# Patient Record
Sex: FEMALE | Race: OTHER | NOT HISPANIC OR LATINO
[De-identification: names, ages, dates, MRNs, and addresses within clinical notes are randomized per-mention and may not be internally consistent; named-entity substitution may affect disease eponyms.]

---

## 2017-01-03 ENCOUNTER — RX RENEWAL (OUTPATIENT)
Age: 77
End: 2017-01-03

## 2017-02-08 ENCOUNTER — APPOINTMENT (OUTPATIENT)
Dept: HEMATOLOGY ONCOLOGY | Facility: CLINIC | Age: 77
End: 2017-02-08

## 2017-02-15 ENCOUNTER — APPOINTMENT (OUTPATIENT)
Dept: HEMATOLOGY ONCOLOGY | Facility: CLINIC | Age: 77
End: 2017-02-15

## 2017-02-22 ENCOUNTER — APPOINTMENT (OUTPATIENT)
Dept: HEMATOLOGY ONCOLOGY | Facility: CLINIC | Age: 77
End: 2017-02-22

## 2017-03-16 ENCOUNTER — LABORATORY RESULT (OUTPATIENT)
Age: 77
End: 2017-03-16

## 2017-03-16 ENCOUNTER — APPOINTMENT (OUTPATIENT)
Dept: HEMATOLOGY ONCOLOGY | Facility: CLINIC | Age: 77
End: 2017-03-16

## 2017-03-16 VITALS — HEART RATE: 93 BPM | TEMPERATURE: 98.4 F | DIASTOLIC BLOOD PRESSURE: 72 MMHG | SYSTOLIC BLOOD PRESSURE: 129 MMHG

## 2017-03-16 VITALS — BODY MASS INDEX: 19.35 KG/M2 | HEIGHT: 59 IN | WEIGHT: 96 LBS

## 2017-03-23 ENCOUNTER — APPOINTMENT (OUTPATIENT)
Dept: HEMATOLOGY ONCOLOGY | Facility: CLINIC | Age: 77
End: 2017-03-23

## 2017-03-23 VITALS
HEIGHT: 59 IN | HEART RATE: 88 BPM | WEIGHT: 95 LBS | TEMPERATURE: 98.9 F | DIASTOLIC BLOOD PRESSURE: 69 MMHG | BODY MASS INDEX: 19.15 KG/M2 | SYSTOLIC BLOOD PRESSURE: 114 MMHG

## 2017-03-31 LAB
HCT VFR BLD CALC: 31.9 %
HGB BLD-MCNC: 9.38 G/DL
MCHC RBC-ENTMCNC: 22.2 PG
MCHC RBC-ENTMCNC: 29.4 GM/DL
MCV RBC AUTO: 75.4 FL
PLATELET # BLD AUTO: 273 K/UL
RBC # BLD: 4.23 M/UL
RBC # FLD: 17.5 %
WBC # FLD AUTO: 5.2 K/UL

## 2017-04-03 ENCOUNTER — OTHER (OUTPATIENT)
Age: 77
End: 2017-04-03

## 2017-07-20 ENCOUNTER — APPOINTMENT (OUTPATIENT)
Dept: HEMATOLOGY ONCOLOGY | Facility: CLINIC | Age: 77
End: 2017-07-20

## 2017-08-01 ENCOUNTER — APPOINTMENT (OUTPATIENT)
Dept: HEMATOLOGY ONCOLOGY | Facility: CLINIC | Age: 77
End: 2017-08-01
Payer: MEDICARE

## 2017-08-01 VITALS
WEIGHT: 94 LBS | HEIGHT: 59 IN | HEART RATE: 76 BPM | DIASTOLIC BLOOD PRESSURE: 77 MMHG | SYSTOLIC BLOOD PRESSURE: 120 MMHG | TEMPERATURE: 98.1 F | BODY MASS INDEX: 18.95 KG/M2

## 2017-08-01 PROCEDURE — 85025 COMPLETE CBC W/AUTO DIFF WBC: CPT

## 2017-08-01 PROCEDURE — 36415 COLL VENOUS BLD VENIPUNCTURE: CPT

## 2017-08-02 ENCOUNTER — LABORATORY RESULT (OUTPATIENT)
Age: 77
End: 2017-08-02

## 2017-08-07 ENCOUNTER — APPOINTMENT (OUTPATIENT)
Dept: HEMATOLOGY ONCOLOGY | Facility: CLINIC | Age: 77
End: 2017-08-07
Payer: MEDICARE

## 2017-08-07 VITALS
BODY MASS INDEX: 18.95 KG/M2 | HEIGHT: 59 IN | HEART RATE: 85 BPM | SYSTOLIC BLOOD PRESSURE: 132 MMHG | TEMPERATURE: 98.1 F | DIASTOLIC BLOOD PRESSURE: 78 MMHG | WEIGHT: 94 LBS

## 2017-08-07 DIAGNOSIS — C77.0 SECONDARY AND UNSPECIFIED MALIGNANT NEOPLASM OF LYMPH NODES OF HEAD, FACE AND NECK: ICD-10-CM

## 2017-08-07 DIAGNOSIS — Z87.39 PERSONAL HISTORY OF OTHER DISEASES OF THE MUSCULOSKELETAL SYSTEM AND CONNECTIVE TISSUE: ICD-10-CM

## 2017-08-07 PROCEDURE — 99214 OFFICE O/P EST MOD 30 MIN: CPT

## 2017-08-16 ENCOUNTER — FORM ENCOUNTER (OUTPATIENT)
Age: 77
End: 2017-08-16

## 2017-08-28 ENCOUNTER — APPOINTMENT (OUTPATIENT)
Dept: NUCLEAR MEDICINE | Facility: CLINIC | Age: 77
End: 2017-08-28

## 2017-08-31 ENCOUNTER — RECORD ABSTRACTING (OUTPATIENT)
Age: 77
End: 2017-08-31

## 2017-08-31 LAB
ALBUMIN SERPL ELPH-MCNC: 4.2 G/DL
ALP BLD-CCNC: 63 U/L
ALT SERPL-CCNC: 16 U/L
ANION GAP SERPL CALC-SCNC: 13 MMOL/L
AST SERPL-CCNC: 18 U/L
BILIRUB SERPL-MCNC: 0.3 MG/DL
BUN SERPL-MCNC: 13 MG/DL
CALCIUM SERPL-MCNC: 9.3 MG/DL
CANCER AG27-29 SERPL-ACNC: 26.4 U/ML
CHLORIDE SERPL-SCNC: 104 MMOL/L
CO2 SERPL-SCNC: 28 MMOL/L
CREAT SERPL-MCNC: 0.77 MG/DL
GLUCOSE SERPL-MCNC: 89 MG/DL
HCT VFR BLD CALC: 36 %
HGB BLD-MCNC: 11.5 G/DL
LDH SERPL-CCNC: 166 U/L
MCHC RBC-ENTMCNC: 27.6 PG
MCHC RBC-ENTMCNC: 32.1 GM/DL
MCV RBC AUTO: 86 FL
PLATELET # BLD AUTO: 222 K/UL
POTASSIUM SERPL-SCNC: 4.7 MMOL/L
PROT SERPL-MCNC: 6.4 G/DL
RBC # BLD: 4.18 M/UL
RBC # FLD: 17.8 %
SODIUM SERPL-SCNC: 145 MMOL/L
WBC # FLD AUTO: 5 K/UL

## 2017-09-01 ENCOUNTER — FORM ENCOUNTER (OUTPATIENT)
Age: 77
End: 2017-09-01

## 2017-09-02 ENCOUNTER — APPOINTMENT (OUTPATIENT)
Dept: NUCLEAR MEDICINE | Facility: CLINIC | Age: 77
End: 2017-09-02
Payer: MEDICARE

## 2017-09-02 ENCOUNTER — OUTPATIENT (OUTPATIENT)
Dept: OUTPATIENT SERVICES | Facility: HOSPITAL | Age: 77
LOS: 1 days | End: 2017-09-02
Payer: MEDICARE

## 2017-09-02 DIAGNOSIS — Z00.8 ENCOUNTER FOR OTHER GENERAL EXAMINATION: ICD-10-CM

## 2017-09-02 DIAGNOSIS — Z90.10 ACQUIRED ABSENCE OF UNSPECIFIED BREAST AND NIPPLE: Chronic | ICD-10-CM

## 2017-09-02 PROCEDURE — 78816 PET IMAGE W/CT FULL BODY: CPT | Mod: 26,PS

## 2017-09-02 PROCEDURE — 78816 PET IMAGE W/CT FULL BODY: CPT

## 2017-09-02 PROCEDURE — A9552: CPT

## 2017-10-08 ENCOUNTER — INPATIENT (INPATIENT)
Facility: HOSPITAL | Age: 77
LOS: 4 days | Discharge: HOME HEALTH | End: 2017-10-13
Attending: INTERNAL MEDICINE | Admitting: INTERNAL MEDICINE
Payer: MEDICARE

## 2017-10-08 VITALS — HEIGHT: 60 IN | WEIGHT: 115.08 LBS

## 2017-10-08 DIAGNOSIS — Z90.10 ACQUIRED ABSENCE OF UNSPECIFIED BREAST AND NIPPLE: Chronic | ICD-10-CM

## 2017-10-08 LAB
ALBUMIN SERPL ELPH-MCNC: 3.3 G/DL — SIGNIFICANT CHANGE UP (ref 3.3–5)
ALLERGY+IMMUNOLOGY DIAG STUDY NOTE: SIGNIFICANT CHANGE UP
ALP SERPL-CCNC: 90 U/L — SIGNIFICANT CHANGE UP (ref 40–120)
ALT FLD-CCNC: 19 U/L — SIGNIFICANT CHANGE UP (ref 12–78)
ANION GAP SERPL CALC-SCNC: 12 MMOL/L — SIGNIFICANT CHANGE UP (ref 5–17)
APPEARANCE UR: (no result)
APTT BLD: 30 SEC — SIGNIFICANT CHANGE UP (ref 27.5–37.4)
AST SERPL-CCNC: 25 U/L — SIGNIFICANT CHANGE UP (ref 15–37)
BACTERIA # UR AUTO: (no result)
BASOPHILS # BLD AUTO: 0 K/UL — SIGNIFICANT CHANGE UP (ref 0–0.2)
BILIRUB SERPL-MCNC: 0.7 MG/DL — SIGNIFICANT CHANGE UP (ref 0.2–1.2)
BILIRUB UR-MCNC: NEGATIVE — SIGNIFICANT CHANGE UP
BUN SERPL-MCNC: 12 MG/DL — SIGNIFICANT CHANGE UP (ref 7–23)
CALCIUM SERPL-MCNC: 9.2 MG/DL — SIGNIFICANT CHANGE UP (ref 8.5–10.1)
CHLORIDE SERPL-SCNC: 110 MMOL/L — HIGH (ref 96–108)
CO2 SERPL-SCNC: 16 MMOL/L — LOW (ref 22–31)
COLOR SPEC: YELLOW — SIGNIFICANT CHANGE UP
CREAT SERPL-MCNC: 0.98 MG/DL — SIGNIFICANT CHANGE UP (ref 0.5–1.3)
DIFF PNL FLD: (no result)
EOSINOPHIL # BLD AUTO: 0 K/UL — SIGNIFICANT CHANGE UP (ref 0–0.5)
EPI CELLS # UR: SIGNIFICANT CHANGE UP
GLUCOSE SERPL-MCNC: 117 MG/DL — HIGH (ref 70–99)
GLUCOSE UR QL: NEGATIVE MG/DL — SIGNIFICANT CHANGE UP
HCT VFR BLD CALC: 39.5 % — SIGNIFICANT CHANGE UP (ref 34.5–45)
HGB BLD-MCNC: 12.8 G/DL — SIGNIFICANT CHANGE UP (ref 11.5–15.5)
INR BLD: 1.12 RATIO — SIGNIFICANT CHANGE UP (ref 0.88–1.16)
KETONES UR-MCNC: NEGATIVE — SIGNIFICANT CHANGE UP
LACTATE SERPL-SCNC: 4.6 MMOL/L — CRITICAL HIGH (ref 0.7–2)
LACTATE SERPL-SCNC: 4.8 MMOL/L — CRITICAL HIGH (ref 0.7–2)
LEUKOCYTE ESTERASE UR-ACNC: (no result)
LYMPHOCYTES # BLD AUTO: 0.2 K/UL — LOW (ref 1–3.3)
LYMPHOCYTES # BLD AUTO: 10 % — LOW (ref 13–44)
MANUAL DIF COMMENT BLD-IMP: SIGNIFICANT CHANGE UP
MCHC RBC-ENTMCNC: 28.5 PG — SIGNIFICANT CHANGE UP (ref 27–34)
MCHC RBC-ENTMCNC: 32.3 GM/DL — SIGNIFICANT CHANGE UP (ref 32–36)
MCV RBC AUTO: 88.1 FL — SIGNIFICANT CHANGE UP (ref 80–100)
MONOCYTES # BLD AUTO: 0 K/UL — SIGNIFICANT CHANGE UP (ref 0–0.9)
MONOCYTES NFR BLD AUTO: 2 % — SIGNIFICANT CHANGE UP (ref 2–14)
NEUTROPHILS # BLD AUTO: 2.4 K/UL — SIGNIFICANT CHANGE UP (ref 1.8–7.4)
NEUTROPHILS NFR BLD AUTO: 82 % — HIGH (ref 43–77)
NEUTS BAND # BLD: 6 % — SIGNIFICANT CHANGE UP (ref 0–8)
NITRITE UR-MCNC: NEGATIVE — SIGNIFICANT CHANGE UP
NRBC # BLD: 4 /100 — HIGH (ref 0–0)
PH UR: 6 — SIGNIFICANT CHANGE UP (ref 5–8)
PLAT MORPH BLD: NORMAL — SIGNIFICANT CHANGE UP
PLATELET # BLD AUTO: 197 K/UL — SIGNIFICANT CHANGE UP (ref 150–400)
POLYCHROMASIA BLD QL SMEAR: SLIGHT — SIGNIFICANT CHANGE UP
POTASSIUM SERPL-MCNC: 3.7 MMOL/L — SIGNIFICANT CHANGE UP (ref 3.5–5.3)
POTASSIUM SERPL-SCNC: 3.7 MMOL/L — SIGNIFICANT CHANGE UP (ref 3.5–5.3)
PROT SERPL-MCNC: 7.5 GM/DL — SIGNIFICANT CHANGE UP (ref 6–8.3)
PROT UR-MCNC: 100 MG/DL
PROTHROM AB SERPL-ACNC: 12.1 SEC — SIGNIFICANT CHANGE UP (ref 9.8–12.7)
RBC # BLD: 4.48 M/UL — SIGNIFICANT CHANGE UP (ref 3.8–5.2)
RBC # FLD: 15.1 % — HIGH (ref 10.3–14.5)
RBC BLD AUTO: SIGNIFICANT CHANGE UP
RBC CASTS # UR COMP ASSIST: (no result) /HPF (ref 0–4)
SODIUM SERPL-SCNC: 138 MMOL/L — SIGNIFICANT CHANGE UP (ref 135–145)
SP GR SPEC: 1.01 — SIGNIFICANT CHANGE UP (ref 1.01–1.02)
UROBILINOGEN FLD QL: NEGATIVE MG/DL — SIGNIFICANT CHANGE UP
WBC # BLD: 2.6 K/UL — LOW (ref 3.8–10.5)
WBC # FLD AUTO: 2.6 K/UL — LOW (ref 3.8–10.5)
WBC UR QL: (no result)

## 2017-10-08 PROCEDURE — 93010 ELECTROCARDIOGRAM REPORT: CPT

## 2017-10-08 PROCEDURE — 74176 CT ABD & PELVIS W/O CONTRAST: CPT | Mod: 26

## 2017-10-08 PROCEDURE — 99291 CRITICAL CARE FIRST HOUR: CPT

## 2017-10-08 PROCEDURE — 71010: CPT | Mod: 26

## 2017-10-08 RX ORDER — SODIUM CHLORIDE 9 MG/ML
1000 INJECTION INTRAMUSCULAR; INTRAVENOUS; SUBCUTANEOUS ONCE
Qty: 0 | Refills: 0 | Status: COMPLETED | OUTPATIENT
Start: 2017-10-08 | End: 2017-10-08

## 2017-10-08 RX ORDER — ONDANSETRON 8 MG/1
4 TABLET, FILM COATED ORAL EVERY 6 HOURS
Qty: 0 | Refills: 0 | Status: DISCONTINUED | OUTPATIENT
Start: 2017-10-08 | End: 2017-10-13

## 2017-10-08 RX ORDER — VANCOMYCIN HCL 1 G
1000 VIAL (EA) INTRAVENOUS ONCE
Qty: 0 | Refills: 0 | Status: COMPLETED | OUTPATIENT
Start: 2017-10-08 | End: 2017-10-08

## 2017-10-08 RX ORDER — ACETAMINOPHEN 500 MG
650 TABLET ORAL ONCE
Qty: 0 | Refills: 0 | Status: COMPLETED | OUTPATIENT
Start: 2017-10-08 | End: 2017-10-08

## 2017-10-08 RX ORDER — PANTOPRAZOLE SODIUM 20 MG/1
40 TABLET, DELAYED RELEASE ORAL ONCE
Qty: 0 | Refills: 0 | Status: COMPLETED | OUTPATIENT
Start: 2017-10-08 | End: 2017-10-08

## 2017-10-08 RX ORDER — CEFTRIAXONE 500 MG/1
1 INJECTION, POWDER, FOR SOLUTION INTRAMUSCULAR; INTRAVENOUS ONCE
Qty: 0 | Refills: 0 | Status: COMPLETED | OUTPATIENT
Start: 2017-10-08 | End: 2017-10-08

## 2017-10-08 RX ORDER — SODIUM CHLORIDE 9 MG/ML
1000 INJECTION INTRAMUSCULAR; INTRAVENOUS; SUBCUTANEOUS
Qty: 0 | Refills: 0 | Status: COMPLETED | OUTPATIENT
Start: 2017-10-08 | End: 2017-10-09

## 2017-10-08 RX ORDER — SENNA PLUS 8.6 MG/1
2 TABLET ORAL AT BEDTIME
Qty: 0 | Refills: 0 | Status: DISCONTINUED | OUTPATIENT
Start: 2017-10-08 | End: 2017-10-13

## 2017-10-08 RX ORDER — ONDANSETRON 8 MG/1
4 TABLET, FILM COATED ORAL ONCE
Qty: 0 | Refills: 0 | Status: COMPLETED | OUTPATIENT
Start: 2017-10-08 | End: 2017-10-08

## 2017-10-08 RX ORDER — INFLUENZA VIRUS VACCINE 15; 15; 15; 15 UG/.5ML; UG/.5ML; UG/.5ML; UG/.5ML
0.5 SUSPENSION INTRAMUSCULAR ONCE
Qty: 0 | Refills: 0 | Status: COMPLETED | OUTPATIENT
Start: 2017-10-08 | End: 2017-10-08

## 2017-10-08 RX ORDER — EXEMESTANE 25 MG/1
25 TABLET, SUGAR COATED ORAL
Qty: 0 | Refills: 0 | Status: COMPLETED | OUTPATIENT
Start: 2017-10-08 | End: 2017-10-09

## 2017-10-08 RX ORDER — SODIUM CHLORIDE 9 MG/ML
500 INJECTION INTRAMUSCULAR; INTRAVENOUS; SUBCUTANEOUS ONCE
Qty: 0 | Refills: 0 | Status: COMPLETED | OUTPATIENT
Start: 2017-10-08 | End: 2017-10-08

## 2017-10-08 RX ORDER — PANTOPRAZOLE SODIUM 20 MG/1
8 TABLET, DELAYED RELEASE ORAL
Qty: 80 | Refills: 0 | Status: DISCONTINUED | OUTPATIENT
Start: 2017-10-08 | End: 2017-10-10

## 2017-10-08 RX ORDER — CEFTRIAXONE 500 MG/1
INJECTION, POWDER, FOR SOLUTION INTRAMUSCULAR; INTRAVENOUS
Qty: 0 | Refills: 0 | Status: DISCONTINUED | OUTPATIENT
Start: 2017-10-08 | End: 2017-10-13

## 2017-10-08 RX ORDER — CEFTRIAXONE 500 MG/1
1 INJECTION, POWDER, FOR SOLUTION INTRAMUSCULAR; INTRAVENOUS EVERY 24 HOURS
Qty: 0 | Refills: 0 | Status: DISCONTINUED | OUTPATIENT
Start: 2017-10-09 | End: 2017-10-13

## 2017-10-08 RX ORDER — HYDROMORPHONE HYDROCHLORIDE 2 MG/ML
0.5 INJECTION INTRAMUSCULAR; INTRAVENOUS; SUBCUTANEOUS ONCE
Qty: 0 | Refills: 0 | Status: DISCONTINUED | OUTPATIENT
Start: 2017-10-08 | End: 2017-10-08

## 2017-10-08 RX ADMIN — PANTOPRAZOLE SODIUM 40 MILLIGRAM(S): 20 TABLET, DELAYED RELEASE ORAL at 15:54

## 2017-10-08 RX ADMIN — SODIUM CHLORIDE 1000 MILLILITER(S): 9 INJECTION INTRAMUSCULAR; INTRAVENOUS; SUBCUTANEOUS at 16:11

## 2017-10-08 RX ADMIN — PANTOPRAZOLE SODIUM 10 MG/HR: 20 TABLET, DELAYED RELEASE ORAL at 21:38

## 2017-10-08 RX ADMIN — SODIUM CHLORIDE 1000 MILLILITER(S): 9 INJECTION INTRAMUSCULAR; INTRAVENOUS; SUBCUTANEOUS at 14:30

## 2017-10-08 RX ADMIN — ONDANSETRON 4 MILLIGRAM(S): 8 TABLET, FILM COATED ORAL at 14:30

## 2017-10-08 RX ADMIN — Medication 250 MILLIGRAM(S): at 17:19

## 2017-10-08 RX ADMIN — SODIUM CHLORIDE 1000 MILLILITER(S): 9 INJECTION INTRAMUSCULAR; INTRAVENOUS; SUBCUTANEOUS at 23:14

## 2017-10-08 RX ADMIN — Medication 650 MILLIGRAM(S): at 16:35

## 2017-10-08 RX ADMIN — PANTOPRAZOLE SODIUM 40 MILLIGRAM(S): 20 TABLET, DELAYED RELEASE ORAL at 14:30

## 2017-10-08 RX ADMIN — CEFTRIAXONE 100 GRAM(S): 500 INJECTION, POWDER, FOR SOLUTION INTRAMUSCULAR; INTRAVENOUS at 16:27

## 2017-10-08 NOTE — ED PROVIDER NOTE - OBJECTIVE STATEMENT
78 y/o F with a PMHx of brain aneurysm last year, Breast CA presents to the ED c/o worsening L sided flank pain over the past few days. Pt family at bedside states that her pain is worsening, experiencing multiple episodes of dark emesis today. No fever, SOB, CP, melena. Sx began today. Recent PET scan was normal. NKA. Oncology is [facility].

## 2017-10-08 NOTE — H&P ADULT - ASSESSMENT
77 y.o. 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 Pt reports coffee ground emesis, nausea/vomiting. Pt states it started this AM and had worsening left flank pain, 10/10 in severity. Denies subjective fever, but has chills. +n/v. Denies CP, SOB, abd pain, dysuria or diarrhea. Denies LH, dizziness, or HA. Currently, pt is painfree.      #severe sepsis/septic shock (leukopenia 2.6, hypotension, lactate 4.8, fever 105.0) due to UTI and left renal colic  #moderate left hydronephrosis, mild left hydroureter  -admit to step down  -iv hydration  -vital signs monitoring  -iv ceftriaxone  -f/u cultures  -repeat lactate  -antipyretics  -pt was seen by ICU, not ICU at this time  -ID consult    #coffee ground emesis, UGIB  -monitor H/H  -iv ppi drip  -GI consult  -NPO except meds for now    #breast cancer s/p R mastectomy  -pt on exemestane 25mg daily    #DVT ppx  -SCDs due to possible UGIB    #pt's daughter Cristiane updated of pt's condition in detail as she's in sepsis. Collateral info received. Cristiane's contact: [phone] (c), [phone] (h).

## 2017-10-08 NOTE — CONSULT NOTE ADULT - SUBJECTIVE AND OBJECTIVE BOX
Called to see the patient in the ED.      The patient is a 77 year old female who developed L Flank pain, fevers, and chills at 6AM.  The pain continued throughout the morning and she was vomiting coffee grinds as well. The pain eventually resloved.  She had a CT in the ED that chowed L hydro but no obstructing stone in the ureter.  She also had a temp to 105 and a Lactate of 4.3 with hypotension.  She is S/P 1 L NS and her BP has improved and the fever is improved.  S/P Rocephin.  She is awaiting a repeat lactate.      PMH: Breast ca, HLD, SAH S/O clip    PSH: Clipping of a cerebral aneurysm, mastectomy, gastric surgery    ALL: NKDA    FH: Noncontributery    SH: Non smoker, no ETOH

## 2017-10-08 NOTE — ED PROVIDER NOTE - CRITICAL CARE PROVIDED
additional history taking/consultation with other physicians/consult w/ pt's family directly relating to pts condition/direct patient care (not related to procedure)/documentation/interpretation of diagnostic studies

## 2017-10-08 NOTE — H&P ADULT - HISTORY OF PRESENT ILLNESS
77 y.o. 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 Pt reports coffee ground emesis, nausea/vomiting. Pt states it started this AM and had worsening left flank pain, 10/10 in severity. Denies subjective fever, but has chills. +n/v. Denies CP, SOB, abd pain, dysuria or diarrhea. Denies LH, dizziness, or HA. Currently, pt is painfree.    PMH: as above  PSH: as above and pyloric stenosis surgery (age 19), right mastectomy with lymph node dissection (2012)  Social Hx: denies tobacco, drugs, social EtOH  Family Hx: noncontrib to current presentation  ROS: per HPI 77 y.o. 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 Pt reports coffee ground emesis, nausea/vomiting. Pt states it started this AM and had worsening left flank pain, 10/10 in severity. Denies subjective fever, but has chills. +n/v. Denies CP, SOB, abd pain, dysuria or diarrhea. Denies LH, dizziness, or HA. Currently, pt is painfree.    Per daughter, pt vomited 3 times with blood. The vomitus appears "black". Per daughter, pt had urinary symptoms for the past week.    In ED, pt received 2L NS, vanco, ceftriaxone    PMH: as above  PSH: as above and pyloric stenosis surgery (age 19), right mastectomy with lymph node dissection (2012)  Social Hx: denies tobacco, drugs, social EtOH  Family Hx: noncontrib to current presentation  ROS: per HPI

## 2017-10-08 NOTE — CONSULT NOTE ADULT - ASSESSMENT
A/P77 female who presented with septic shock likely from a  source and possible UGIB    Plan:  Patients BP has improved after IVF  S/P rocephin  She likely has passed the stone  She will need a repeat Lactate, Needs to finish the 30 cc/kg fluid challenge  Was called into --no intervention at this time  Will need PPiI Drip and GI consult--it is likely from wrenching and vomiting from the kidney stome and not likely a primary UGIB  D/W Dr. Rossi and Dr. Montalvo in the ED  The patient does not require at this time.

## 2017-10-08 NOTE — ED ADULT NURSE REASSESSMENT NOTE - NS ED NURSE REASSESS COMMENT FT1
Pt has sudden onset of chills, MD Clark made aware of PT rectal temp 105, orders placed for blood cultures & lactate, phlebotomy at bedside.

## 2017-10-08 NOTE — SEPSIS NOTE - ASSESSMENT
77 y.o. 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    Pt had Tm 105, BP 79/58 in ED. Currently, vitals improved.    A/P:  See H&P

## 2017-10-08 NOTE — H&P ADULT - NSHPPHYSICALEXAM_GEN_ALL_CORE
Vital Signs Last 24 Hrs  T(C): 37.8 (08 Oct 2017 18:30), Max: 40.6 (08 Oct 2017 16:13)  T(F): 100 (08 Oct 2017 18:30), Max: 105 (08 Oct 2017 16:13)  HR: 83 (08 Oct 2017 19:00) (65 - 126)  BP: 102/59 (08 Oct 2017 19:00) (79/58 - 125/87)  BP(mean): --  RR: 19 (08 Oct 2017 19:00) (18 - 22)  SpO2: 98% (08 Oct 2017 19:00) (97% - 100%)    GEN: appears comfortable, fatigued  Neuro: AAOx3, nonfocal  HEENT: NC/AT, EOMI  Neck: no thyroidmegaly, no JVD  Cardiovascular: S1S2 present, regular rhythm, no murmur  Respiratory: breath sounds normal bilaterally, no wheezing, no rales, no rhonchi  Gastrointestinal: bowel sounds normal, soft, no abdominal tenderness  Musculoskeletal: no muscle tenderness  Extremities: No edema  Skin: No rash Vital Signs Last 24 Hrs  T(C): 37.8 (08 Oct 2017 18:30), Max: 40.6 (08 Oct 2017 16:13)  T(F): 100 (08 Oct 2017 18:30), Max: 105 (08 Oct 2017 16:13)  HR: 83 (08 Oct 2017 19:00) (65 - 126)  BP: 102/59 (08 Oct 2017 19:00) (79/58 - 125/87)  BP(mean): --  RR: 19 (08 Oct 2017 19:00) (18 - 22)  SpO2: 98% (08 Oct 2017 19:00) (97% - 100%)    GEN: appears comfortable, fatigued. Dried blood on mouth.  Neuro: AAOx3, nonfocal  HEENT: NC/AT, EOMI  Neck: no thyroidmegaly, no JVD  Cardiovascular: S1S2 present, regular rhythm, no murmur  Respiratory: breath sounds normal bilaterally, no wheezing, no rales, no rhonchi  Gastrointestinal: bowel sounds normal, soft, no abdominal tenderness  Musculoskeletal: no muscle tenderness  Extremities: No edema  Skin: No rash

## 2017-10-08 NOTE — ED STATDOCS - PROGRESS NOTE DETAILS
Kimberly Carmichael Stack: 78 y/o F with a PMHx of brain aneurysm last year, Breast CA presents to the ED c/o worsening L sided flank pain over the past few days. Pt family at bedside states that her pain is worsening, experiencing multiple episodes of dark emesis today. Pt being sent to the [facility] for further eval and tx.

## 2017-10-08 NOTE — SEPSIS NOTE - NSSUBJFT_GEN_A_CORE
77 y.o. 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

## 2017-10-08 NOTE — ED PROVIDER NOTE - PROGRESS NOTE DETAILS
Kimberly VARGAS: Dr. Clark has made pt and family aware of CT results and present kidney stone. Scribe DG: Pt VS improved. Spoke with Dr. Barrett. Although pt has hydronephosis, no obstructing stone is seen. He advised iv antibiotics and fluids, and treatment of her gi bleed. Amber VARGAS

## 2017-10-08 NOTE — ED ADULT NURSE NOTE - OBJECTIVE STATEMENT
Pt is a 77y female, VSS, A & O x 3, presents to ED w/ left flank pain & nausea, vomiting x 1 day, pt denies chest pain, SOB, fever, chills, pt vomiting x 1 upon arrival, vitaly burgess MD Cusati made aware, IV lock placed in left AC, unable to obtain lab work, phlebotomy called, unable to obtain lab work, MD Clark aware, pt in no apparent distress, will continue to monitor.

## 2017-10-09 DIAGNOSIS — K92.2 GASTROINTESTINAL HEMORRHAGE, UNSPECIFIED: ICD-10-CM

## 2017-10-09 DIAGNOSIS — N13.30 UNSPECIFIED HYDRONEPHROSIS: ICD-10-CM

## 2017-10-09 LAB
ANION GAP SERPL CALC-SCNC: 11 MMOL/L — SIGNIFICANT CHANGE UP (ref 5–17)
BUN SERPL-MCNC: 18 MG/DL — SIGNIFICANT CHANGE UP (ref 7–23)
CALCIUM SERPL-MCNC: 7.2 MG/DL — LOW (ref 8.5–10.1)
CHLORIDE SERPL-SCNC: 122 MMOL/L — HIGH (ref 96–108)
CO2 SERPL-SCNC: 17 MMOL/L — LOW (ref 22–31)
CREAT SERPL-MCNC: 0.81 MG/DL — SIGNIFICANT CHANGE UP (ref 0.5–1.3)
E COLI DNA BLD POS QL NAA+NON-PROBE: SIGNIFICANT CHANGE UP
GLUCOSE SERPL-MCNC: 55 MG/DL — LOW (ref 70–99)
GRAM STN FLD: SIGNIFICANT CHANGE UP
GRAM STN FLD: SIGNIFICANT CHANGE UP
HCT VFR BLD CALC: 32.3 % — LOW (ref 34.5–45)
HGB BLD-MCNC: 10.2 G/DL — LOW (ref 11.5–15.5)
LACTATE SERPL-SCNC: 2.7 MMOL/L — HIGH (ref 0.7–2)
MCHC RBC-ENTMCNC: 28.4 PG — SIGNIFICANT CHANGE UP (ref 27–34)
MCHC RBC-ENTMCNC: 31.7 GM/DL — LOW (ref 32–36)
MCV RBC AUTO: 89.4 FL — SIGNIFICANT CHANGE UP (ref 80–100)
METHOD TYPE: SIGNIFICANT CHANGE UP
PLATELET # BLD AUTO: 169 K/UL — SIGNIFICANT CHANGE UP (ref 150–400)
POTASSIUM SERPL-MCNC: 3.1 MMOL/L — LOW (ref 3.5–5.3)
POTASSIUM SERPL-SCNC: 3.1 MMOL/L — LOW (ref 3.5–5.3)
RBC # BLD: 3.61 M/UL — LOW (ref 3.8–5.2)
RBC # FLD: 15.9 % — HIGH (ref 10.3–14.5)
SODIUM SERPL-SCNC: 150 MMOL/L — HIGH (ref 135–145)
SPECIMEN SOURCE: SIGNIFICANT CHANGE UP
SPECIMEN SOURCE: SIGNIFICANT CHANGE UP
WBC # BLD: 34.3 K/UL — HIGH (ref 3.8–10.5)
WBC # FLD AUTO: 34.3 K/UL — HIGH (ref 3.8–10.5)

## 2017-10-09 PROCEDURE — 50432 PLMT NEPHROSTOMY CATHETER: CPT | Mod: LT

## 2017-10-09 PROCEDURE — 74176 CT ABD & PELVIS W/O CONTRAST: CPT | Mod: 26

## 2017-10-09 RX ORDER — SODIUM CHLORIDE 9 MG/ML
1000 INJECTION INTRAMUSCULAR; INTRAVENOUS; SUBCUTANEOUS ONCE
Qty: 0 | Refills: 0 | Status: COMPLETED | OUTPATIENT
Start: 2017-10-09 | End: 2017-10-09

## 2017-10-09 RX ORDER — ACETAMINOPHEN 500 MG
1000 TABLET ORAL ONCE
Qty: 0 | Refills: 0 | Status: COMPLETED | OUTPATIENT
Start: 2017-10-09 | End: 2017-10-09

## 2017-10-09 RX ORDER — PHENYLEPHRINE HYDROCHLORIDE 10 MG/ML
0.5 INJECTION INTRAVENOUS
Qty: 80 | Refills: 0 | Status: DISCONTINUED | OUTPATIENT
Start: 2017-10-09 | End: 2017-10-11

## 2017-10-09 RX ORDER — OXYCODONE HYDROCHLORIDE 5 MG/1
5 TABLET ORAL EVERY 6 HOURS
Qty: 0 | Refills: 0 | Status: DISCONTINUED | OUTPATIENT
Start: 2017-10-09 | End: 2017-10-13

## 2017-10-09 RX ADMIN — Medication 1000 MILLIGRAM(S): at 16:30

## 2017-10-09 RX ADMIN — Medication 400 MILLIGRAM(S): at 16:20

## 2017-10-09 RX ADMIN — SODIUM CHLORIDE 100 MILLILITER(S): 9 INJECTION INTRAMUSCULAR; INTRAVENOUS; SUBCUTANEOUS at 08:19

## 2017-10-09 RX ADMIN — PANTOPRAZOLE SODIUM 10 MG/HR: 20 TABLET, DELAYED RELEASE ORAL at 19:14

## 2017-10-09 RX ADMIN — PHENYLEPHRINE HYDROCHLORIDE 9.79 MICROGRAM(S)/KG/MIN: 10 INJECTION INTRAVENOUS at 09:33

## 2017-10-09 RX ADMIN — PANTOPRAZOLE SODIUM 10 MG/HR: 20 TABLET, DELAYED RELEASE ORAL at 09:34

## 2017-10-09 RX ADMIN — SODIUM CHLORIDE 1000 MILLILITER(S): 9 INJECTION INTRAMUSCULAR; INTRAVENOUS; SUBCUTANEOUS at 01:19

## 2017-10-09 RX ADMIN — SODIUM CHLORIDE 2000 MILLILITER(S): 9 INJECTION INTRAMUSCULAR; INTRAVENOUS; SUBCUTANEOUS at 08:17

## 2017-10-09 RX ADMIN — SODIUM CHLORIDE 100 MILLILITER(S): 9 INJECTION INTRAMUSCULAR; INTRAVENOUS; SUBCUTANEOUS at 18:25

## 2017-10-09 NOTE — PROGRESS NOTE ADULT - SUBJECTIVE AND OBJECTIVE BOX
CC:    HPI:  77 y.o. 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 Pt reports coffee ground emesis, nausea/vomiting. Pt states it started this AM and had worsening left flank pain, 10/10 in severity. Denies subjective fever, but has chills. +n/v. Denies CP, SOB, abd pain, dysuria or diarrhea. Denies LH, dizziness, or HA. Currently, pt is painfree.    Per daughter, pt vomited 3 times with blood. The vomitus appears "black". Per daughter, pt had urinary symptoms for the past week.    In ED, pt received 2L NS, vanco, ceftriaxone    10/9: Lactate has improved however still with hypotension.  Currently getting a 1L Bolus.  No melena.  No back or flank pain    PMH: as above  PSH: as above and pyloric stenosis surgery (age 19), right mastectomy with lymph node dissection ()  Social Hx: denies tobacco, drugs, social EtOH  Family Hx: noncontrib to current presentation  ROS: per HPI (08 Oct 2017 20:15)       PAST MEDICAL & SURGICAL HISTORY:  Breast CA  H/O mastectomy      FAMILY HISTORY:      Social Hx:    Allergies    No Known Allergies    Intolerances        Height (cm): 152.4 (10-08 @ 13:01)  Weight (kg): 52.2 (10-08 @ 13:01)  BMI (kg/m2): 22.5 (10-08 @ 13:01)    ICU Vital Signs Last 24 Hrs  T(C): 36.7 (09 Oct 2017 05:49), Max: 40.6 (08 Oct 2017 16:13)  T(F): 98 (09 Oct 2017 05:49), Max: 105 (08 Oct 2017 16:13)  HR: 73 (09 Oct 2017 05:00) (65 - 126)  BP: 87/36 (09 Oct 2017 05:00) (68/38 - 125/87)  BP(mean): 49 (09 Oct 2017 05:00) (44 - 73)  ABP: --  ABP(mean): --  RR: 16 (09 Oct 2017 05:00) (16 - 23)  SpO2: 99% (09 Oct 2017 04:00) (95% - 100%)          I&O's Summary    08 Oct 2017 07:01  -  09 Oct 2017 06:55  --------------------------------------------------------  IN: 2220 mL / OUT: 200 mL / NET: 2020 mL                              12.8   2.6   )-----------( 197      ( 08 Oct 2017 15:26 )             39.5       10    138  |  110<H>  |  12  ----------------------------<  117<H>  3.7   |  16<L>  |  0.98    Ca    9.2      08 Oct 2017 14:41    TPro  7.5  /  Alb  3.3  /  TBili  0.7  /  DBili  x   /  AST  25  /  ALT  19  /  AlkPhos  90  10                Urinalysis Basic - ( 08 Oct 2017 17:05 )    Color: Yellow / Appearance: Slightly Turbid / S.010 / pH: x  Gluc: x / Ketone: Negative  / Bili: Negative / Urobili: Negative mg/dL   Blood: x / Protein: 100 mg/dL / Nitrite: Negative   Leuk Esterase: Moderate / RBC: 6-10 /HPF / WBC 11-25   Sq Epi: x / Non Sq Epi: Occasional / Bacteria: Many        MEDICATIONS  (STANDING):  cefTRIAXone   IVPB      cefTRIAXone   IVPB 1 Gram(s) IV Intermittent every 24 hours  exemestane 25 milliGRAM(s) Oral <User Schedule>  influenza   Vaccine 0.5 milliLiter(s) IntraMuscular once  pantoprazole Infusion 8 mG/Hr (10 mL/Hr) IV Continuous <Continuous>  sodium chloride 0.9%. 1000 milliLiter(s) (100 mL/Hr) IV Continuous <Continuous>    MEDICATIONS  (PRN):  ondansetron Injectable 4 milliGRAM(s) IV Push every 6 hours PRN Nausea  senna 2 Tablet(s) Oral at bedtime PRN Constipation      DVT Prophylaxis:    Advanced Directives:  Discussed with:    Visit Information:    ** Time is exclusive of billed procedures and/or teaching and/or routine family updates.

## 2017-10-09 NOTE — PROGRESS NOTE ADULT - ASSESSMENT
A/P77 female who presented with septic shock likely from a  source and possible UGIB    Plan:  Currently receiving another L Bolus of NS  MAy need pressors  Continue rocephin  She likely has passed the stone  IF she remains hypotensive she may need to see if the L Hydro continues and still may need a PCN  Was called into --no intervention at this time  Continue PPiI Drip and GI consult--it is likely from wrenching and vomiting from the kidney stone and not likely a primary UGIB

## 2017-10-09 NOTE — BRIEF OPERATIVE NOTE - PROCEDURE
<<-----Click on this checkbox to enter Procedure Nephrostomy of left kidney with imaging guidance  10/09/2017    Active  DAXELROD

## 2017-10-09 NOTE — CHART NOTE - NSCHARTNOTEFT_GEN_A_CORE
spoke to Dr Dodge- IR     patient to be changed to NPO   repeat CT scan     may possibly need perc nephrostomy tube

## 2017-10-09 NOTE — CONSULT NOTE ADULT - SUBJECTIVE AND OBJECTIVE BOX
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 Pt reports coffee ground emesis, nausea/vomiting. She had worsening left flank pain, 10/10 in severity. Denies subjective fever, but has chills. +n/v. Per daughter, pt vomited 3 times with blood. The vomitus appears "black". Per daughter, pt had urinary symptoms for the past week. In ER< she was noted febrile to 105F. She received vancomycin IV and ceftriaxone.       PMH: as above  PSH: as above; pyloric stenosis surgery (age 19), right mastectomy with lymph node dissection ()  Meds: per reconciliation sheet, noted below  MEDICATIONS  (STANDING):  cefTRIAXone   IVPB      cefTRIAXone   IVPB 1 Gram(s) IV Intermittent every 24 hours  influenza   Vaccine 0.5 milliLiter(s) IntraMuscular once  pantoprazole Infusion 8 mG/Hr (10 mL/Hr) IV Continuous <Continuous>  phenylephrine    Infusion 0.5 MICROgram(s)/kG/Min (9.787 mL/Hr) IV Continuous <Continuous>  sodium chloride 0.9%. 1000 milliLiter(s) (100 mL/Hr) IV Continuous <Continuous>    MEDICATIONS  (PRN):  ondansetron Injectable 4 milliGRAM(s) IV Push every 6 hours PRN Nausea  senna 2 Tablet(s) Oral at bedtime PRN Constipation    Allergies    No Known Allergies    Intolerances      Social: no smoking, no alcohol, no illegal drugs; no recent travel, no exposure to TB  FAMILY HISTORY:    ROS: the patient denies HA, no dizziness, no sore throat, no blurry vision, no CP, no palpitations, no SOB, no cough, no abdominal pain, no diarrhea, no N/V, no dysuria, no leg pain, has left flank pain, no rash, no joint aches, no rectal pain or bleeding, no night sweats    Vital Signs Last 24 Hrs  T(C): 37 (09 Oct 2017 09:30), Max: 40.6 (08 Oct 2017 16:13)  T(F): 98.6 (09 Oct 2017 09:30), Max: 105 (08 Oct 2017 16:13)  HR: 76 (09 Oct 2017 09:57) (65 - 126)  BP: 99/51 (09 Oct 2017 09:57) (68/38 - 125/87)  BP(mean): 62 (09 Oct 2017 09:57) (43 - 73)  RR: 19 (09 Oct 2017 09:57) (16 - 23)  SpO2: 100% (09 Oct 2017 09:57) (95% - 100%)  Daily Height in cm: 152.4 (08 Oct 2017 13:01)    Daily     PE:    Constitutional: frail looking  HEENT: NC/AT, EOMI, PERRLA  Neck: supple  Back: no tenderness  Respiratory: clear  Cardiovascular: S1S2 regular, no murmurs  Abdomen: soft, not tender, not distended, positive BS  Genitourinary: no LN palpable  Rectal: deferred  Musculoskeletal: no muscle tenderness, no joint swelling; has left flank tenderness  Extremities: no pedal edema  Neurological: AxOx3, moving all extremities  Skin: no rashes    Labs:                        12.8   2.6   )-----------( 197      ( 08 Oct 2017 15:26 )             39.5     10-08    138  |  110<H>  |  12  ----------------------------<  117<H>  3.7   |  16<L>  |  0.98    Ca    9.2      08 Oct 2017 14:41    TPro  7.5  /  Alb  3.3  /  TBili  0.7  /  DBili  x   /  AST  25  /  ALT  19  /  AlkPhos  90  10-08     LIVER FUNCTIONS - ( 08 Oct 2017 14:41 )  Alb: 3.3 g/dL / Pro: 7.5 gm/dL / ALK PHOS: 90 U/L / ALT: 19 U/L / AST: 25 U/L / GGT: x           Urinalysis Basic - ( 08 Oct 2017 17:05 )    Color: Yellow / Appearance: Slightly Turbid / S.010 / pH: x  Gluc: x / Ketone: Negative  / Bili: Negative / Urobili: Negative mg/dL   Blood: x / Protein: 100 mg/dL / Nitrite: Negative   Leuk Esterase: Moderate / RBC: 6-10 /HPF / WBC 11-25   Sq Epi: x / Non Sq Epi: Occasional / Bacteria: Many        Culture Results:   Growth in aerobic bottle:  Gram Negative Rods  ***Blood Panel PCR results on this specimen are available  approximately 3 hours after the Gram stain result.***  Gram stain, PCR, and/or culture results may not always  correspond due to difference inmethodologies.  ************************************************************  This PCR assay was performed using [employer].  The following targets are tested for: Enterococcus,  vancomycin resistant enterococci, Listeria monocytogenes,  coagulase negative staphylococci, S. aureus,  methicillin resistant S. aureus, Streptococcus agalactiae  (Group B), S. pneumoniae, S. pyogenes (Group A),  Acinetobacter baumannii, Enterobacter cloacae, E. coli,  Klebsiella oxytoca, K. pneumoniae, Proteus sp.,  Serratia marcescens, Haemophilus influenzae,  Neisseria meningitidis, Pseudomonas aeruginosa, Candida  albicans, C. glabrata, C krusei, C parapsilosis,  C. tropicalis and the KPC resistance gene. (10-08 @ 16:25)    Radiology:    < from: CT Renal Stone Hunt (10.08.17 @ 14:01) >  moderate LEFT hydronephrosis and mild LEFT hydroureter. An   obstructing calculus is not identified.  No perinephric infiltration is   seen. There is a 3 mm calculus at the lower pole of the LEFT kidney.            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 Pt reports coffee ground emesis, nausea/vomiting. She had worsening left flank pain, 10/10 in severity. Denies subjective fever, but has chills. +n/v. Per daughter, pt vomited 3 times with blood. The vomitus appears "black". Per daughter, pt had urinary symptoms for the past week. In ER< she was noted febrile to 105F. She received vancomycin IV and ceftriaxone.       PMH: as above  PSH: as above; pyloric stenosis surgery (age 19), right mastectomy with lymph node dissection ()  Meds: per reconciliation sheet, noted below  MEDICATIONS  (STANDING):  cefTRIAXone   IVPB      cefTRIAXone   IVPB 1 Gram(s) IV Intermittent every 24 hours  influenza   Vaccine 0.5 milliLiter(s) IntraMuscular once  pantoprazole Infusion 8 mG/Hr (10 mL/Hr) IV Continuous <Continuous>  phenylephrine    Infusion 0.5 MICROgram(s)/kG/Min (9.787 mL/Hr) IV Continuous <Continuous>  sodium chloride 0.9%. 1000 milliLiter(s) (100 mL/Hr) IV Continuous <Continuous>    MEDICATIONS  (PRN):  ondansetron Injectable 4 milliGRAM(s) IV Push every 6 hours PRN Nausea  senna 2 Tablet(s) Oral at bedtime PRN Constipation    Allergies    No Known Allergies    Intolerances      Social: no smoking, no alcohol, no illegal drugs; no recent travel, no exposure to TB  FAMILY HISTORY:    ROS: the patient denies HA, no dizziness, no sore throat, no blurry vision, no CP, no palpitations, no SOB, no cough, no abdominal pain, no diarrhea, no N/V, no dysuria, no leg pain, has left flank pain, no rash, no joint aches, no rectal pain or bleeding, no night sweats    Vital Signs Last 24 Hrs  T(C): 37 (09 Oct 2017 09:30), Max: 40.6 (08 Oct 2017 16:13)  T(F): 98.6 (09 Oct 2017 09:30), Max: 105 (08 Oct 2017 16:13)  HR: 76 (09 Oct 2017 09:57) (65 - 126)  BP: 99/51 (09 Oct 2017 09:57) (68/38 - 125/87)  BP(mean): 62 (09 Oct 2017 09:57) (43 - 73)  RR: 19 (09 Oct 2017 09:57) (16 - 23)  SpO2: 100% (09 Oct 2017 09:57) (95% - 100%)  Daily Height in cm: 152.4 (08 Oct 2017 13:01)    Daily     PE:    Constitutional: frail looking  HEENT: NC/AT, EOMI, PERRLA  Neck: supple  Back: no tenderness  Respiratory: clear  Cardiovascular: S1S2 regular, no murmurs  Abdomen: soft, not tender, not distended, positive BS  Genitourinary: no LN palpable  Rectal: deferred  Musculoskeletal: no muscle tenderness, no joint swelling; has left flank tenderness  Extremities: no pedal edema  Neurological: AxOx3, moving all extremities  Skin: no rashes    Labs:                        12.8   2.6   )-----------( 197      ( 08 Oct 2017 15:26 )             39.5     10-08    138  |  110<H>  |  12  ----------------------------<  117<H>  3.7   |  16<L>  |  0.98    Ca    9.2      08 Oct 2017 14:41    TPro  7.5  /  Alb  3.3  /  TBili  0.7  /  DBili  x   /  AST  25  /  ALT  19  /  AlkPhos  90  10-08     LIVER FUNCTIONS - ( 08 Oct 2017 14:41 )  Alb: 3.3 g/dL / Pro: 7.5 gm/dL / ALK PHOS: 90 U/L / ALT: 19 U/L / AST: 25 U/L / GGT: x           Urinalysis Basic - ( 08 Oct 2017 17:05 )    Color: Yellow / Appearance: Slightly Turbid / S.010 / pH: x  Gluc: x / Ketone: Negative  / Bili: Negative / Urobili: Negative mg/dL   Blood: x / Protein: 100 mg/dL / Nitrite: Negative   Leuk Esterase: Moderate / RBC: 6-10 /HPF / WBC 11-25   Sq Epi: x / Non Sq Epi: Occasional / Bacteria: Many        Culture Results:   Growth in aerobic bottle:  Gram Negative Rods  ***Blood Panel PCR results on this specimen are available  approximately 3 hours after the Gram stain result.***  Gram stain, PCR, and/or culture results may not always  correspond due to difference inmethodologies.  ************************************************************  This PCR assay was performed using [employer].  The following targets are tested for: Enterococcus,  vancomycin resistant enterococci, Listeria monocytogenes,  coagulase negative staphylococci, S. aureus,  methicillin resistant S. aureus, Streptococcus agalactiae  (Group B), S. pneumoniae, S. pyogenes (Group A),  Acinetobacter baumannii, Enterobacter cloacae, E. coli,  Klebsiella oxytoca, K. pneumoniae, Proteus sp.,  Serratia marcescens, Haemophilus influenzae,  Neisseria meningitidis, Pseudomonas aeruginosa, Candida  albicans, C. glabrata, C krusei, C parapsilosis,  C. tropicalis and the KPC resistance gene. (10-08 @ 16:25)    Radiology:    < from: CT Renal Stone Hunt (10.08.17 @ 14:01) >  moderate LEFT hydronephrosis and mild LEFT hydroureter. An   obstructing calculus is not identified.  No perinephric infiltration is   seen. There is a 3 mm calculus at the lower pole of the LEFT kidney.            < end of copied text >      Advanced directives addressed: full resuscitation    Advanced care planning and DNR :   discussion and decision planning about the patient's care in case  he cannot speak for himself. His personal wishes were discussed with his health care proxy, family.   discussed with the patient and the patient health care proxy   30 min. face to face contact with the patient.

## 2017-10-09 NOTE — CHART NOTE - NSCHARTNOTEFT_GEN_A_CORE
Given continue hypotension s/p 4 liters of normal saline will start pressors neosynephrine at this time.   continue monitoring patient. patient may need possible gu/ir eval for perc nephrostomy

## 2017-10-09 NOTE — PROGRESS NOTE ADULT - SUBJECTIVE AND OBJECTIVE BOX
CHIEF COMPLAINT: left hydro    HISTORY OF PRESENT ILLNESS: History from pt and daughter. Pt with recent left ranal colic. No prior history of stones, howeever, daughter has has stones. Pt came to [facility] with nausea and vomiting coffee ground material. CT shows mild left hydro but no stone seen in ureter. Pt treated for sepsis and is on pressors. Due to increasing doses of levaphed, in light of left hydro and apparent sepsis, a left pcn was place. Currently output is pink, without purulence. Unknown at this time if purulent material was obtained at initial access to left kidney collecting system.    PAST MEDICAL & SURGICAL HISTORY:  Breast CA  H/O mastectomy      REVIEW OF SYSTEMS:Same previous  MEDICATIONS  (STANDING):  cefTRIAXone   IVPB      cefTRIAXone   IVPB 1 Gram(s) IV Intermittent every 24 hours  influenza   Vaccine 0.5 milliLiter(s) IntraMuscular once  pantoprazole Infusion 8 mG/Hr (10 mL/Hr) IV Continuous <Continuous>  phenylephrine    Infusion 0.5 MICROgram(s)/kG/Min (9.787 mL/Hr) IV Continuous <Continuous>  sodium chloride 0.9%. 1000 milliLiter(s) (100 mL/Hr) IV Continuous <Continuous>    MEDICATIONS  (PRN):  ondansetron Injectable 4 milliGRAM(s) IV Push every 6 hours PRN Nausea  senna 2 Tablet(s) Oral at bedtime PRN Constipation      PE:Same Previous    Allergies    No Known Allergies    Intolerances        SOCIAL HISTORY:Same previous    FAMILY HISTORY:      Vital Signs Last 24 Hrs  T(C): 36.1 (09 Oct 2017 15:50), Max: 37.8 (08 Oct 2017 18:30)  T(F): 97 (09 Oct 2017 15:50), Max: 100 (08 Oct 2017 18:30)  HR: 73 (09 Oct 2017 16:15) (70 - 93)  BP: 109/80 (09 Oct 2017 16:15) (63/40 - 129/57)  BP(mean): 87 (09 Oct 2017 16:15) (43 - 87)  RR: 24 (09 Oct 2017 16:15) (16 - 24)  SpO2: 96% (09 Oct 2017 16:15) (95% - 100%)    PHYSICAL EXAM: Left pcn in place and output pink, reportedly 50 cc in here in icu.  LABS:                        10.2   34.3  )-----------( 169      ( 09 Oct 2017 16:13 )             32.3     10    138  |  110<H>  |  12  ----------------------------<  117<H>  3.7   |  16<L>  |  0.98    Ca    9.2      08 Oct 2017 14:41    TPro  7.5  /  Alb  3.3  /  TBili  0.7  /  DBili  x   /  AST  25  /  ALT  19  /  AlkPhos  90  10-08    PT/INR - ( 08 Oct 2017 18:20 )   PT: 12.1 sec;   INR: 1.12 ratio         PTT - ( 08 Oct 2017 18:20 )  PTT:30.0 sec  Urinalysis Basic - ( 08 Oct 2017 17:05 )    Color: Yellow / Appearance: Slightly Turbid / S.010 / pH: x  Gluc: x / Ketone: Negative  / Bili: Negative / Urobili: Negative mg/dL   Blood: x / Protein: 100 mg/dL / Nitrite: Negative   Leuk Esterase: Moderate / RBC: 6-10 /HPF / WBC 11-25   Sq Epi: x / Non Sq Epi: Occasional / Bacteria: Many      Urine Culture:     RADIOLOGY & ADDITIONAL STUDIES:

## 2017-10-09 NOTE — CHART NOTE - NSCHARTNOTEFT_GEN_A_CORE
spoke to IR nurse    patient with dark tarry vomitus during IR procedure. Spoke to Dr Ibarra GI- patient set up for egd in AM  will do a cbc stat to monitor h/h

## 2017-10-09 NOTE — CHART NOTE - NSCHARTNOTEFT_GEN_A_CORE
Received call from RN re: BP 79/49 despite adequate fluid resuscitation (30cc/kg). Pt already received 2.5L bolus and /hr. Will give stat 1L NS bolus and ICU consult, Dr Demarco notified.

## 2017-10-09 NOTE — BRIEF OPERATIVE NOTE - OPERATION/FINDINGS
hydroureter out of proportion to hydronephrosis. sono guided access. fluoro guidance. distal ureteral obstruction. 8fr pcn placed. clear urine.

## 2017-10-09 NOTE — PROGRESS NOTE ADULT - SUBJECTIVE AND OBJECTIVE BOX
Called back to see the patient    She is comfortable and sleeping.       For a L NS bolus and will recheck the lactate after the bolus.      If necessary will start pressors.      D/W hospitalist

## 2017-10-09 NOTE — CONSULT NOTE ADULT - SUBJECTIVE AND OBJECTIVE BOX
HPI:  77 y.o. 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 Pt reports coffee ground emesis, nausea/vomiting. Pt states it started this AM and had worsening left flank pain, 10/10 in severity. Denies subjective fever, but has chills. +n/v. Denies CP, SOB, abd pain, dysuria or diarrhea. Denies LH, dizziness, or HA. Currently, pt is painfree.    Per daughter, pt vomited 3 times with blood. The vomitus appears "black". Per daughter, pt had urinary symptoms for the past week.    In ED, pt received 2L NS, vanco, ceftriaxone    PMH: as above  PSH: as above and pyloric stenosis surgery (age 19), right mastectomy with lymph node dissection (2012)  Social Hx: denies tobacco, drugs, social EtOH  Family Hx: noncontrib to current presentation  ROS: per HPI (08 Oct 2017 20:15)      PAST MEDICAL & SURGICAL HISTORY:  Breast CA  H/O mastectomy      MEDICATIONS  (STANDING):  cefTRIAXone   IVPB      cefTRIAXone   IVPB 1 Gram(s) IV Intermittent every 24 hours  exemestane 25 milliGRAM(s) Oral after breakfast  influenza   Vaccine 0.5 milliLiter(s) IntraMuscular once  pantoprazole Infusion 8 mG/Hr (10 mL/Hr) IV Continuous <Continuous>  phenylephrine    Infusion 0.5 MICROgram(s)/kG/Min (9.787 mL/Hr) IV Continuous <Continuous>  sodium chloride 0.9%. 1000 milliLiter(s) (100 mL/Hr) IV Continuous <Continuous>    MEDICATIONS  (PRN):  ondansetron Injectable 4 milliGRAM(s) IV Push every 6 hours PRN Nausea  senna 2 Tablet(s) Oral at bedtime PRN Constipation      Allergies    No Known Allergies    Intolerances        SOCIAL HISTORY:    FAMILY HISTORY:   Non-contributory    REVIEW OF SYSTEMS      General:	    Respiratory and Thorax:  	  Cardiovascular:	    Gastrointestinal:	    Musculoskeletal:	   Vital Signs Last 24 Hrs  T(C): 36.7 (09 Oct 2017 05:49), Max: 40.6 (08 Oct 2017 16:13)  T(F): 98 (09 Oct 2017 05:49), Max: 105 (08 Oct 2017 16:13)  HR: 75 (09 Oct 2017 08:01) (65 - 126)  BP: 70/34 (09 Oct 2017 08:01) (68/38 - 125/87)  BP(mean): 43 (09 Oct 2017 08:01) (43 - 73)  RR: 17 (09 Oct 2017 08:01) (16 - 23)  SpO2: 100% (09 Oct 2017 08:01) (95% - 100%)    HEENT :No Pallor.No icterus. EOMI,PERLAA  Chest : Clear to Auscultation  CVS : S1S2 Normal.No murmurs.  Abdomen: Soft.Non tender .Normal bowel sounds.No Organomegaly.  CNS: Alert.Oriented to Time,Place and Person.No focal deficit.  EXT: Normal Range of motion.No pitting edema.    LABS:                        12.8   2.6   )-----------( 197      ( 08 Oct 2017 15:26 )             39.5     10-08    138  |  110<H>  |  12  ----------------------------<  117<H>  3.7   |  16<L>  |  0.98    Ca    9.2      08 Oct 2017 14:41    TPro  7.5  /  Alb  3.3  /  TBili  0.7  /  DBili  x   /  AST  25  /  ALT  19  /  AlkPhos  90  10-08    PT/INR - ( 08 Oct 2017 18:20 )   PT: 12.1 sec;   INR: 1.12 ratio         PTT - ( 08 Oct 2017 18:20 )  PTT:30.0 sec  LIVER FUNCTIONS - ( 08 Oct 2017 14:41 )  Alb: 3.3 g/dL / Pro: 7.5 gm/dL / ALK PHOS: 90 U/L / ALT: 19 U/L / AST: 25 U/L / GGT: x             RADIOLOGY & ADDITIONAL STUDIES:

## 2017-10-09 NOTE — CONSULT NOTE ADULT - ASSESSMENT
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 Pt reports coffee ground emesis, nausea/vomiting. She had worsening left flank pain, 10/10 in severity. Denies subjective fever, but has chills. +n/v. Per daughter, pt vomited 3 times with blood. The vomitus appears "black". Per daughter, pt had urinary symptoms for the past week. In ER< she was noted febrile to 105F. She received vancomycin IV and ceftriaxone.     1. Febrile syndrome. Sepsis with GNR, Likley UTI. Left kidney stone.  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 Pt reports coffee ground emesis, nausea/vomiting. She had worsening left flank pain, 10/10 in severity. Denies subjective fever, but has chills. +n/v. Per daughter, pt vomited 3 times with blood. The vomitus appears "black". Per daughter, pt had urinary symptoms for the past week. In ER< she was noted febrile to 105F. She received vancomycin IV and ceftriaxone.     1. Febrile syndrome. Hypotension. Sepsis with GNR. Likely UTI. Left kidney stone.  -obtain BC x 2, urine c/s  -pressors  -agree with ceftriaxone 1 gm IV QD  -reason for abx use and side effects reviewed with patient; monitor BMP  -pan management  -monitor temps  -f/u CBC  -supportive care  2. Other issues:   -care per medicine

## 2017-10-10 LAB
-  AMIKACIN: SIGNIFICANT CHANGE UP
-  AMPICILLIN/SULBACTAM: SIGNIFICANT CHANGE UP
-  AMPICILLIN: SIGNIFICANT CHANGE UP
-  AZTREONAM: SIGNIFICANT CHANGE UP
-  CEFAZOLIN: SIGNIFICANT CHANGE UP
-  CEFEPIME: SIGNIFICANT CHANGE UP
-  CEFOXITIN: SIGNIFICANT CHANGE UP
-  CEFTAZIDIME: SIGNIFICANT CHANGE UP
-  CEFTRIAXONE: SIGNIFICANT CHANGE UP
-  CIPROFLOXACIN: SIGNIFICANT CHANGE UP
-  ERTAPENEM: SIGNIFICANT CHANGE UP
-  GENTAMICIN: SIGNIFICANT CHANGE UP
-  IMIPENEM: SIGNIFICANT CHANGE UP
-  LEVOFLOXACIN: SIGNIFICANT CHANGE UP
-  MEROPENEM: SIGNIFICANT CHANGE UP
-  NITROFURANTOIN: SIGNIFICANT CHANGE UP
-  PIPERACILLIN/TAZOBACTAM: SIGNIFICANT CHANGE UP
-  TOBRAMYCIN: SIGNIFICANT CHANGE UP
-  TRIMETHOPRIM/SULFAMETHOXAZOLE: SIGNIFICANT CHANGE UP
ANION GAP SERPL CALC-SCNC: 11 MMOL/L — SIGNIFICANT CHANGE UP (ref 5–17)
BUN SERPL-MCNC: 20 MG/DL — SIGNIFICANT CHANGE UP (ref 7–23)
CALCIUM SERPL-MCNC: 7.8 MG/DL — LOW (ref 8.5–10.1)
CHLORIDE SERPL-SCNC: 120 MMOL/L — HIGH (ref 96–108)
CO2 SERPL-SCNC: 18 MMOL/L — LOW (ref 22–31)
CREAT SERPL-MCNC: 0.76 MG/DL — SIGNIFICANT CHANGE UP (ref 0.5–1.3)
CULTURE RESULTS: SIGNIFICANT CHANGE UP
GLUCOSE SERPL-MCNC: 41 MG/DL — CRITICAL LOW (ref 70–99)
HCT VFR BLD CALC: 29.6 % — LOW (ref 34.5–45)
HGB BLD-MCNC: 9.5 G/DL — LOW (ref 11.5–15.5)
MCHC RBC-ENTMCNC: 28.5 PG — SIGNIFICANT CHANGE UP (ref 27–34)
MCHC RBC-ENTMCNC: 32.2 GM/DL — SIGNIFICANT CHANGE UP (ref 32–36)
MCV RBC AUTO: 88.6 FL — SIGNIFICANT CHANGE UP (ref 80–100)
METHOD TYPE: SIGNIFICANT CHANGE UP
ORGANISM # SPEC MICROSCOPIC CNT: SIGNIFICANT CHANGE UP
ORGANISM # SPEC MICROSCOPIC CNT: SIGNIFICANT CHANGE UP
PLATELET # BLD AUTO: 137 K/UL — LOW (ref 150–400)
POTASSIUM SERPL-MCNC: 3.1 MMOL/L — LOW (ref 3.5–5.3)
POTASSIUM SERPL-SCNC: 3.1 MMOL/L — LOW (ref 3.5–5.3)
RBC # BLD: 3.35 M/UL — LOW (ref 3.8–5.2)
RBC # FLD: 16 % — HIGH (ref 10.3–14.5)
SODIUM SERPL-SCNC: 149 MMOL/L — HIGH (ref 135–145)
SPECIMEN SOURCE: SIGNIFICANT CHANGE UP
WBC # BLD: 32.8 K/UL — HIGH (ref 3.8–10.5)
WBC # FLD AUTO: 32.8 K/UL — HIGH (ref 3.8–10.5)

## 2017-10-10 RX ORDER — PANTOPRAZOLE SODIUM 20 MG/1
40 TABLET, DELAYED RELEASE ORAL
Qty: 0 | Refills: 0 | Status: DISCONTINUED | OUTPATIENT
Start: 2017-10-10 | End: 2017-10-12

## 2017-10-10 RX ORDER — SODIUM CHLORIDE 9 MG/ML
1000 INJECTION INTRAMUSCULAR; INTRAVENOUS; SUBCUTANEOUS
Qty: 0 | Refills: 0 | Status: DISCONTINUED | OUTPATIENT
Start: 2017-10-10 | End: 2017-10-11

## 2017-10-10 RX ORDER — POTASSIUM CHLORIDE 20 MEQ
10 PACKET (EA) ORAL
Qty: 0 | Refills: 0 | Status: COMPLETED | OUTPATIENT
Start: 2017-10-10 | End: 2017-10-10

## 2017-10-10 RX ORDER — DEXTROSE 50 % IN WATER 50 %
50 SYRINGE (ML) INTRAVENOUS ONCE
Qty: 0 | Refills: 0 | Status: COMPLETED | OUTPATIENT
Start: 2017-10-10 | End: 2017-10-10

## 2017-10-10 RX ADMIN — CEFTRIAXONE 100 GRAM(S): 500 INJECTION, POWDER, FOR SOLUTION INTRAMUSCULAR; INTRAVENOUS at 17:16

## 2017-10-10 RX ADMIN — Medication 100 MILLIEQUIVALENT(S): at 10:41

## 2017-10-10 RX ADMIN — PANTOPRAZOLE SODIUM 10 MG/HR: 20 TABLET, DELAYED RELEASE ORAL at 05:37

## 2017-10-10 RX ADMIN — Medication 100 MILLIEQUIVALENT(S): at 09:10

## 2017-10-10 RX ADMIN — SODIUM CHLORIDE 100 MILLILITER(S): 9 INJECTION INTRAMUSCULAR; INTRAVENOUS; SUBCUTANEOUS at 01:52

## 2017-10-10 RX ADMIN — Medication 50 MILLILITER(S): at 06:17

## 2017-10-10 RX ADMIN — PANTOPRAZOLE SODIUM 40 MILLIGRAM(S): 20 TABLET, DELAYED RELEASE ORAL at 18:30

## 2017-10-10 RX ADMIN — Medication 100 MILLIEQUIVALENT(S): at 12:18

## 2017-10-10 NOTE — PROGRESS NOTE ADULT - SUBJECTIVE AND OBJECTIVE BOX
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 Pt reports coffee ground emesis, nausea/vomiting. She had worsening left flank pain, 10/10 in severity. Denies subjective fever, but has chills. +n/v. Per daughter, pt vomited 3 times with blood. The vomitus appears "black". Per daughter, pt had urinary symptoms for the past week. In ER< she was noted febrile to 105F. She received vancomycin IV and ceftriaxone.     Alert and verbal  Hemodinamically improved, but still on pressors  Fever is down    MEDICATIONS  (STANDING):  cefTRIAXone   IVPB      cefTRIAXone   IVPB 1 Gram(s) IV Intermittent every 24 hours  influenza   Vaccine 0.5 milliLiter(s) IntraMuscular once  pantoprazole  Injectable 40 milliGRAM(s) IV Push two times a day  phenylephrine    Infusion 0.5 MICROgram(s)/kG/Min (9.787 mL/Hr) IV Continuous <Continuous>  potassium chloride  10 mEq/100 mL IVPB 10 milliEquivalent(s) IV Intermittent every 1 hour    MEDICATIONS  (PRN):  ondansetron Injectable 4 milliGRAM(s) IV Push every 6 hours PRN Nausea  oxyCODONE    IR 5 milliGRAM(s) Oral every 6 hours PRN Moderate Pain (4 - 6)  senna 2 Tablet(s) Oral at bedtime PRN Constipation      Vital Signs Last 24 Hrs  T(C): 36.1 (10 Oct 2017 09:00), Max: 36.6 (09 Oct 2017 19:30)  T(F): 97 (10 Oct 2017 09:00), Max: 97.8 (09 Oct 2017 19:30)  HR: 65 (10 Oct 2017 09:30) (48 - 75)  BP: 118/103 (10 Oct 2017 09:30) (84/54 - 136/63)  BP(mean): 107 (10 Oct 2017 09:30) (50 - 115)  RR: 19 (10 Oct 2017 09:30) (14 - 24)  SpO2: 98% (10 Oct 2017 09:30) (92% - 100%)    Physical Exam:      Constitutional: frail looking  HEENT: NC/AT, EOMI, PERRLA  Neck: supple  Back: no tenderness  Respiratory: clear  Cardiovascular: S1S2 regular, no murmurs  Abdomen: soft, not tender, not distended, positive BS  Genitourinary: no LN palpable  Rectal: deferred  Musculoskeletal: no muscle tenderness, no joint swelling; has left flank tenderness  Extremities: no pedal edema  Neurological: AxOx3, moving all extremities  Skin: no rashes    Labs:                        9.5    32.8  )-----------( 137      ( 10 Oct 2017 05:25 )             29.6     10-10    149<H>  |  120<H>  |  20  ----------------------------<  41<LL>  3.1<L>   |  18<L>  |  0.76    Ca    7.8<L>      10 Oct 2017 05:25    TPro  7.5  /  Alb  3.3  /  TBili  0.7  /  DBili  x   /  AST  25  /  ALT  19  /  AlkPhos  90  10-08                            12.8   2.6   )-----------( 197      ( 08 Oct 2017 15:26 )             39.5     10-08    138  |  110<H>  |  12  ----------------------------<  117<H>  3.7   |  16<L>  |  0.98    Ca    9.2      08 Oct 2017 14:41    TPro  7.5  /  Alb  3.3  /  TBili  0.7  /  DBili  x   /  AST  25  /  ALT  19  /  AlkPhos  90  10-08     LIVER FUNCTIONS - ( 08 Oct 2017 14:41 )  Alb: 3.3 g/dL / Pro: 7.5 gm/dL / ALK PHOS: 90 U/L / ALT: 19 U/L / AST: 25 U/L / GGT: x           Urinalysis Basic - ( 08 Oct 2017 17:05 )    Color: Yellow / Appearance: Slightly Turbid / S.010 / pH: x  Gluc: x / Ketone: Negative  / Bili: Negative / Urobili: Negative mg/dL   Blood: x / Protein: 100 mg/dL / Nitrite: Negative   Leuk Esterase: Moderate / RBC: 6-10 /HPF / WBC 11-25   Sq Epi: x / Non Sq Epi: Occasional / Bacteria: Many      Culture - Urine (collected 08 Oct 2017 17:05)  Source: .Urine Clean Catch (Midstream)  Preliminary Report (09 Oct 2017 18:27):    >100,000 CFU/ml Escherichia coli    Culture - Blood (collected 08 Oct 2017 16:25)  Source: .Blood None  Gram Stain (09 Oct 2017 06:56):    Growth in aerobic bottle:    Gram Negative Rods  Preliminary Report (10 Oct 2017 07:52):    Growth in aerobic bottle: Escherichia coli    ***Blood Panel PCR results on this specimen are available    approximately 3 hours after the Gram stain result.***    Gram stain, PCR, and/or culture results may not always    correspond due to difference in methodologies.    ************************************************************    This PCR assay was performed using [employer].    The following targets are tested for: Enterococcus,    vancomycin resistant enterococci, Listeria monocytogenes,    coagulase negative staphylococci, S. aureus,    methicillin resistant S. aureus, Streptococcus agalactiae    (Group B), S. pneumoniae, S. pyogenes (Group A),    Acinetobacter baumannii, Enterobacter cloacae, E. coli,    Klebsiella oxytoca, K. pneumoniae, Proteus sp.,    Serratia marcescens, Haemophilus influenzae,    Neisseria meningitidis, Pseudomonas aeruginosa, Candida    albicans, C. glabrata, C krusei, C parapsilosis,    C. tropicalis and the KPC resistance gene.  Organism: Blood Culture PCR (09 Oct 2017 08:34)  Organism: Blood Culture PCR (09 Oct 2017 08:34)      -  Escherichia coli: Detec      Method Type: PCR    Culture - Blood (collected 08 Oct 2017 16:25)  Source: .Blood None  Gram Stain (09 Oct 2017 16:06):    Growth in aerobic bottle: Gram Negative Rods  Preliminary Report (09 Oct 2017 16:07):    Growth in aerobic bottle: Gram Negative Rods        Radiology:    < from: CT Renal Stone [facility] (10.08.17 @ 14:01) >  moderate LEFT hydronephrosis and mild LEFT hydroureter. An   obstructing calculus is not identified.  No perinephric infiltration is   seen. There is a 3 mm calculus at the lower pole of the LEFT kidney.            < end of copied text >      Advanced directives addressed: full resuscitation    Advanced care planning and DNR :   discussion and decision planning about the patient's care in case  he cannot speak for himself. His personal wishes were discussed with his health care proxy, family.   discussed with the patient and the patient health care proxy   30 min. face to face contact with the patient. 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 Pt reports coffee ground emesis, nausea/vomiting. She had worsening left flank pain, 10/10 in severity. Denies subjective fever, but has chills. +n/v. Per daughter, pt vomited 3 times with blood. The vomitus appears "black". Per daughter, pt had urinary symptoms for the past week. In ER< she was noted febrile to 105F. She received vancomycin IV and ceftriaxone.     Alert and verbal  Hemodinamically improved, but still on pressors  Fever is down    MEDICATIONS  (STANDING):  cefTRIAXone   IVPB      cefTRIAXone   IVPB 1 Gram(s) IV Intermittent every 24 hours  influenza   Vaccine 0.5 milliLiter(s) IntraMuscular once  pantoprazole  Injectable 40 milliGRAM(s) IV Push two times a day  phenylephrine    Infusion 0.5 MICROgram(s)/kG/Min (9.787 mL/Hr) IV Continuous <Continuous>  potassium chloride  10 mEq/100 mL IVPB 10 milliEquivalent(s) IV Intermittent every 1 hour    MEDICATIONS  (PRN):  ondansetron Injectable 4 milliGRAM(s) IV Push every 6 hours PRN Nausea  oxyCODONE    IR 5 milliGRAM(s) Oral every 6 hours PRN Moderate Pain (4 - 6)  senna 2 Tablet(s) Oral at bedtime PRN Constipation      Vital Signs Last 24 Hrs  T(C): 36.1 (10 Oct 2017 09:00), Max: 36.6 (09 Oct 2017 19:30)  T(F): 97 (10 Oct 2017 09:00), Max: 97.8 (09 Oct 2017 19:30)  HR: 65 (10 Oct 2017 09:30) (48 - 75)  BP: 118/103 (10 Oct 2017 09:30) (84/54 - 136/63)  BP(mean): 107 (10 Oct 2017 09:30) (50 - 115)  RR: 19 (10 Oct 2017 09:30) (14 - 24)  SpO2: 98% (10 Oct 2017 09:30) (92% - 100%)    Physical Exam:      Constitutional: frail looking  HEENT: NC/AT, EOMI, PERRLA  Neck: supple  Back: no tenderness  Respiratory: clear  Cardiovascular: S1S2 regular, no murmurs  Abdomen: soft, not tender, not distended, positive BS  Genitourinary: no LN palpable  Rectal: deferred  Musculoskeletal: no muscle tenderness, no joint swelling; has left flank tenderness  Extremities: no pedal edema  Neurological: AxOx3, moving all extremities  Skin: no rashes    Labs:                        9.5    32.8  )-----------( 137      ( 10 Oct 2017 05:25 )             29.6     10-10    149<H>  |  120<H>  |  20  ----------------------------<  41<LL>  3.1<L>   |  18<L>  |  0.76    Ca    7.8<L>      10 Oct 2017 05:25    TPro  7.5  /  Alb  3.3  /  TBili  0.7  /  DBili  x   /  AST  25  /  ALT  19  /  AlkPhos  90  10-08                            12.8   2.6   )-----------( 197      ( 08 Oct 2017 15:26 )             39.5     10-08    138  |  110<H>  |  12  ----------------------------<  117<H>  3.7   |  16<L>  |  0.98    Ca    9.2      08 Oct 2017 14:41    TPro  7.5  /  Alb  3.3  /  TBili  0.7  /  DBili  x   /  AST  25  /  ALT  19  /  AlkPhos  90  10-08     LIVER FUNCTIONS - ( 08 Oct 2017 14:41 )  Alb: 3.3 g/dL / Pro: 7.5 gm/dL / ALK PHOS: 90 U/L / ALT: 19 U/L / AST: 25 U/L / GGT: x           Urinalysis Basic - ( 08 Oct 2017 17:05 )    Color: Yellow / Appearance: Slightly Turbid / S.010 / pH: x  Gluc: x / Ketone: Negative  / Bili: Negative / Urobili: Negative mg/dL   Blood: x / Protein: 100 mg/dL / Nitrite: Negative   Leuk Esterase: Moderate / RBC: 6-10 /HPF / WBC 11-25   Sq Epi: x / Non Sq Epi: Occasional / Bacteria: Many      Culture - Urine (collected 08 Oct 2017 17:05)  Source: .Urine Clean Catch (Midstream)  Preliminary Report (09 Oct 2017 18:27):    >100,000 CFU/ml Escherichia coli    Culture - Blood (collected 08 Oct 2017 16:25)  Source: .Blood None  Gram Stain (09 Oct 2017 06:56):    Growth in aerobic bottle:    Gram Negative Rods  Preliminary Report (10 Oct 2017 07:52):    Growth in aerobic bottle: Escherichia coli    ***Blood Panel PCR results on this specimen are available    approximately 3 hours after the Gram stain result.***    Gram stain, PCR, and/or culture results may not always    correspond due to difference in methodologies.    ************************************************************    This PCR assay was performed using [employer].    The following targets are tested for: Enterococcus,    vancomycin resistant enterococci, Listeria monocytogenes,    coagulase negative staphylococci, S. aureus,    methicillin resistant S. aureus, Streptococcus agalactiae    (Group B), S. pneumoniae, S. pyogenes (Group A),    Acinetobacter baumannii, Enterobacter cloacae, E. coli,    Klebsiella oxytoca, K. pneumoniae, Proteus sp.,    Serratia marcescens, Haemophilus influenzae,    Neisseria meningitidis, Pseudomonas aeruginosa, Candida    albicans, C. glabrata, C krusei, C parapsilosis,    C. tropicalis and the KPC resistance gene.  Organism: Blood Culture PCR (09 Oct 2017 08:34)  Organism: Blood Culture PCR (09 Oct 2017 08:34)      -  Escherichia coli: Detec      Method Type: PCR    Culture - Blood (collected 08 Oct 2017 16:25)  Source: .Blood None  Gram Stain (09 Oct 2017 16:06):    Growth in aerobic bottle: Gram Negative Rods  Preliminary Report (09 Oct 2017 16:07):    Growth in aerobic bottle: Gram Negative Rods        Radiology:    < from: CT Renal Stone [facility] (10.08.17 @ 14:01) >  moderate LEFT hydronephrosis and mild LEFT hydroureter. An   obstructing calculus is not identified.  No perinephric infiltration is   seen. There is a 3 mm calculus at the lower pole of the LEFT kidney.            < end of copied text >      Advanced directives addressed: full resuscitation

## 2017-10-10 NOTE — PROVIDER CONTACT NOTE (CRITICAL VALUE NOTIFICATION) - SITUATION
Dr Kirk already in a critical event with another ICU pt, made aware serum glucose was 41 at 0600 but IV Dextrose 25 grams given, order received by Dr Kirk for IV Dextrose 25 grams after med given.  Pt lethargic but easily arousable, alert and oriented to myself and environment.  Pt denies feeling syncope, lightheadness or faint.  Speech is clear and logical.  Pt warm and dry.  Asymptomatic.

## 2017-10-10 NOTE — PROGRESS NOTE ADULT - SUBJECTIVE AND OBJECTIVE BOX
Interval History:    MEDICATIONS  (STANDING):  cefTRIAXone   IVPB      cefTRIAXone   IVPB 1 Gram(s) IV Intermittent every 24 hours  influenza   Vaccine 0.5 milliLiter(s) IntraMuscular once  pantoprazole Infusion 8 mG/Hr (10 mL/Hr) IV Continuous <Continuous>  phenylephrine    Infusion 0.5 MICROgram(s)/kG/Min (9.787 mL/Hr) IV Continuous <Continuous>  potassium chloride  10 mEq/100 mL IVPB 10 milliEquivalent(s) IV Intermittent every 1 hour    MEDICATIONS  (PRN):  ondansetron Injectable 4 milliGRAM(s) IV Push every 6 hours PRN Nausea  oxyCODONE    IR 5 milliGRAM(s) Oral every 6 hours PRN Moderate Pain (4 - 6)  senna 2 Tablet(s) Oral at bedtime PRN Constipation      Daily     Daily   BMI: 22.5 (10-08 @ 13:01)  Change in Weight:  Vital Signs Last 24 Hrs  T(C): 36.3 (10 Oct 2017 04:00), Max: 37 (09 Oct 2017 09:30)  T(F): 97.4 (10 Oct 2017 04:00), Max: 98.6 (09 Oct 2017 09:30)  HR: 53 (10 Oct 2017 07:00) (53 - 80)  BP: 108/50 (10 Oct 2017 07:00) (63/40 - 135/70)  BP(mean): 64 (10 Oct 2017 07:00) (43 - 115)  RR: 15 (10 Oct 2017 07:00) (14 - 24)  SpO2: 97% (10 Oct 2017 04:00) (92% - 100%)  I&O's Detail    09 Oct 2017 07:01  -  10 Oct 2017 07:00  --------------------------------------------------------  IN:    pantoprazole Infusion: 230 mL    phenylephrine   Infusion: 459.6 mL    sodium chloride 0.9%: 2300 mL  Total IN: 2989.6 mL    OUT:    Drain: 310 mL    Voided: 1000 mL  Total OUT: 1310 mL    Total NET: 1679.6 mL          PHYSICAL EXAM  General:  Well developed, well nourished, alert and active, no pallor, NAD.  HEENT:    Normal appearance of conjunctiva, ears, nose, lips, oropharynx, and oral mucosa, anicteric.  Neck:  No masses, no asymmetry.  Lymph Nodes:  No lymphadenopathy.   Cardiovascular:  RRR normal S1/S2, no murmur.  Respiratory:  CTA B/L, normal respiratory effort.   Abdominal:   soft, no masses or tenderness, normoactive BS, NT/ND, no HSM.  Extremities:   No clubbing or cyanosis, normal capillary refill, no edema.   Skin:   No rash, jaundice, lesions, eczema.   Musculoskeletal:  No joint swelling, erythema or tenderness.   Other:     Lab Results:                        9.5    32.8  )-----------( 137      ( 10 Oct 2017 05:25 )             29.6     10-10    149<H>  |  120<H>  |  20  ----------------------------<  41<LL>  3.1<L>   |  18<L>  |  0.76    Ca    7.8<L>      10 Oct 2017 05:25    TPro  7.5  /  Alb  3.3  /  TBili  0.7  /  DBili  x   /  AST  25  /  ALT  19  /  AlkPhos  90  10-08    LIVER FUNCTIONS - ( 08 Oct 2017 14:41 )  Alb: 3.3 g/dL / Pro: 7.5 gm/dL / ALK PHOS: 90 U/L / ALT: 19 U/L / AST: 25 U/L / GGT: x           PT/INR - ( 08 Oct 2017 18:20 )   PT: 12.1 sec;   INR: 1.12 ratio         PTT - ( 08 Oct 2017 18:20 )  PTT:30.0 sec      Stool Results:          RADIOLOGY RESULTS:    SURGICAL PATHOLOGY:

## 2017-10-10 NOTE — PROGRESS NOTE ADULT - ASSESSMENT
Coffee ground emesisi -no recurrence since yesterday  Patient refused an Endoscopy-she agrees to have an EGD if there is a recurrence of bleeding

## 2017-10-10 NOTE — PROGRESS NOTE ADULT - SUBJECTIVE AND OBJECTIVE BOX
CHIEF COMPLAINT: left hydro    HISTORY OF PRESENT ILLNESS: status post insertion left pcn. Urine recovered at that time reportedly yellow without purulence. Left pcn in place and output pink clear and improving. No complaints of nausea nor vomiting.    PAST MEDICAL & SURGICAL HISTORY:  Breast CA  H/O mastectomy      REVIEW OF SYSTEMS:Same previous  MEDICATIONS  (STANDING):  cefTRIAXone   IVPB      cefTRIAXone   IVPB 1 Gram(s) IV Intermittent every 24 hours  influenza   Vaccine 0.5 milliLiter(s) IntraMuscular once  pantoprazole Infusion 8 mG/Hr (10 mL/Hr) IV Continuous <Continuous>  phenylephrine    Infusion 0.5 MICROgram(s)/kG/Min (9.787 mL/Hr) IV Continuous <Continuous>  potassium chloride  10 mEq/100 mL IVPB 10 milliEquivalent(s) IV Intermittent every 1 hour    MEDICATIONS  (PRN):  ondansetron Injectable 4 milliGRAM(s) IV Push every 6 hours PRN Nausea  oxyCODONE    IR 5 milliGRAM(s) Oral every 6 hours PRN Moderate Pain (4 - 6)  senna 2 Tablet(s) Oral at bedtime PRN Constipation      PE:Same Previous    Allergies    No Known Allergies    Intolerances        SOCIAL HISTORY:Same previous    FAMILY HISTORY:      Vital Signs Last 24 Hrs  T(C): 36.3 (10 Oct 2017 04:00), Max: 37 (09 Oct 2017 09:30)  T(F): 97.4 (10 Oct 2017 04:00), Max: 98.6 (09 Oct 2017 09:30)  HR: 53 (10 Oct 2017 07:00) (53 - 80)  BP: 108/50 (10 Oct 2017 07:00) (63/40 - 135/70)  BP(mean): 64 (10 Oct 2017 07:00) (45 - 115)  RR: 15 (10 Oct 2017 07:00) (14 - 24)  SpO2: 97% (10 Oct 2017 04:00) (92% - 100%)    PHYSICAL EXAM:Same previous    LABS:                        9.5    32.8  )-----------( 137      ( 10 Oct 2017 05:25 )             29.6     10-10    149<H>  |  120<H>  |  20  ----------------------------<  41<LL>  3.1<L>   |  18<L>  |  0.76    Ca    7.8<L>      10 Oct 2017 05:25    TPro  7.5  /  Alb  3.3  /  TBili  0.7  /  DBili  x   /  AST  25  /  ALT  19  /  AlkPhos  90  10-08    PT/INR - ( 08 Oct 2017 18:20 )   PT: 12.1 sec;   INR: 1.12 ratio         PTT - ( 08 Oct 2017 18:20 )  PTT:30.0 sec  Urinalysis Basic - ( 08 Oct 2017 17:05 )    Color: Yellow / Appearance: Slightly Turbid / S.010 / pH: x  Gluc: x / Ketone: Negative  / Bili: Negative / Urobili: Negative mg/dL   Blood: x / Protein: 100 mg/dL / Nitrite: Negative   Leuk Esterase: Moderate / RBC: 6-10 /HPF / WBC 11-25   Sq Epi: x / Non Sq Epi: Occasional / Bacteria: Many      Urine Culture:     RADIOLOGY & ADDITIONAL STUDIES:

## 2017-10-10 NOTE — PROGRESS NOTE ADULT - ASSESSMENT
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 Pt reports coffee ground emesis, nausea/vomiting. She had worsening left flank pain, 10/10 in severity. Denies subjective fever, but has chills. +n/v. Per daughter, pt vomited 3 times with blood. The vomitus appears "black". Per daughter, pt had urinary symptoms for the past week. In ER< she was noted febrile to 105F. She received vancomycin IV and ceftriaxone.     1. Febrile syndrome improving. Hypotension. Sepsis with E.coli. UTI with E.coli. Left kidney stone.  -improving  -f/u BC x 2, urine c/s  -taper pressors   -on ceftriaxone 1 gm IV QD # 2  -tolerating abx well so far; no side effects noted  -continue abx coverage  -repeat BC x 2  -pan management  -monitor temps  -f/u CBC  -supportive care  2. Other issues:   -care per medicine

## 2017-10-11 DIAGNOSIS — N39.0 URINARY TRACT INFECTION, SITE NOT SPECIFIED: ICD-10-CM

## 2017-10-11 DIAGNOSIS — C50.911 MALIGNANT NEOPLASM OF UNSPECIFIED SITE OF RIGHT FEMALE BREAST: ICD-10-CM

## 2017-10-11 DIAGNOSIS — R78.81 BACTEREMIA: ICD-10-CM

## 2017-10-11 LAB
-  AMIKACIN: SIGNIFICANT CHANGE UP
-  AMPICILLIN/SULBACTAM: SIGNIFICANT CHANGE UP
-  AMPICILLIN: SIGNIFICANT CHANGE UP
-  AZTREONAM: SIGNIFICANT CHANGE UP
-  CEFAZOLIN: SIGNIFICANT CHANGE UP
-  CEFEPIME: SIGNIFICANT CHANGE UP
-  CEFOXITIN: SIGNIFICANT CHANGE UP
-  CEFTAZIDIME: SIGNIFICANT CHANGE UP
-  CEFTRIAXONE: SIGNIFICANT CHANGE UP
-  CIPROFLOXACIN: SIGNIFICANT CHANGE UP
-  ERTAPENEM: SIGNIFICANT CHANGE UP
-  GENTAMICIN: SIGNIFICANT CHANGE UP
-  IMIPENEM: SIGNIFICANT CHANGE UP
-  LEVOFLOXACIN: SIGNIFICANT CHANGE UP
-  MEROPENEM: SIGNIFICANT CHANGE UP
-  PIPERACILLIN/TAZOBACTAM: SIGNIFICANT CHANGE UP
-  TOBRAMYCIN: SIGNIFICANT CHANGE UP
-  TRIMETHOPRIM/SULFAMETHOXAZOLE: SIGNIFICANT CHANGE UP
ADD ON TEST-SPECIMEN IN LAB: SIGNIFICANT CHANGE UP
ANION GAP SERPL CALC-SCNC: 7 MMOL/L — SIGNIFICANT CHANGE UP (ref 5–17)
BUN SERPL-MCNC: 12 MG/DL — SIGNIFICANT CHANGE UP (ref 7–23)
CALCIUM SERPL-MCNC: 8 MG/DL — LOW (ref 8.5–10.1)
CHLORIDE SERPL-SCNC: 117 MMOL/L — HIGH (ref 96–108)
CO2 SERPL-SCNC: 24 MMOL/L — SIGNIFICANT CHANGE UP (ref 22–31)
CREAT SERPL-MCNC: 0.7 MG/DL — SIGNIFICANT CHANGE UP (ref 0.5–1.3)
CULTURE RESULTS: SIGNIFICANT CHANGE UP
CULTURE RESULTS: SIGNIFICANT CHANGE UP
GLUCOSE SERPL-MCNC: 89 MG/DL — SIGNIFICANT CHANGE UP (ref 70–99)
HCT VFR BLD CALC: 28.9 % — LOW (ref 34.5–45)
HGB BLD-MCNC: 9.2 G/DL — LOW (ref 11.5–15.5)
MAGNESIUM SERPL-MCNC: 1.5 MG/DL — LOW (ref 1.6–2.6)
MCHC RBC-ENTMCNC: 28.1 PG — SIGNIFICANT CHANGE UP (ref 27–34)
MCHC RBC-ENTMCNC: 31.8 GM/DL — LOW (ref 32–36)
MCV RBC AUTO: 88.4 FL — SIGNIFICANT CHANGE UP (ref 80–100)
METHOD TYPE: SIGNIFICANT CHANGE UP
ORGANISM # SPEC MICROSCOPIC CNT: SIGNIFICANT CHANGE UP
PLATELET # BLD AUTO: 111 K/UL — LOW (ref 150–400)
POTASSIUM SERPL-MCNC: 3 MMOL/L — LOW (ref 3.5–5.3)
POTASSIUM SERPL-SCNC: 3 MMOL/L — LOW (ref 3.5–5.3)
RBC # BLD: 3.27 M/UL — LOW (ref 3.8–5.2)
RBC # FLD: 15.6 % — HIGH (ref 10.3–14.5)
SODIUM SERPL-SCNC: 148 MMOL/L — HIGH (ref 135–145)
SPECIMEN SOURCE: SIGNIFICANT CHANGE UP
SPECIMEN SOURCE: SIGNIFICANT CHANGE UP
WBC # BLD: 22.5 K/UL — HIGH (ref 3.8–10.5)
WBC # FLD AUTO: 22.5 K/UL — HIGH (ref 3.8–10.5)

## 2017-10-11 RX ORDER — EXEMESTANE 25 MG/1
25 TABLET, SUGAR COATED ORAL
Qty: 0 | Refills: 0 | Status: DISCONTINUED | OUTPATIENT
Start: 2017-10-11 | End: 2017-10-11

## 2017-10-11 RX ORDER — EXEMESTANE 25 MG/1
25 TABLET, SUGAR COATED ORAL
Qty: 0 | Refills: 0 | Status: DISCONTINUED | OUTPATIENT
Start: 2017-10-12 | End: 2017-10-13

## 2017-10-11 RX ORDER — POTASSIUM CHLORIDE 20 MEQ
20 PACKET (EA) ORAL ONCE
Qty: 0 | Refills: 0 | Status: COMPLETED | OUTPATIENT
Start: 2017-10-11 | End: 2017-10-11

## 2017-10-11 RX ORDER — MAGNESIUM SULFATE 500 MG/ML
1 VIAL (ML) INJECTION ONCE
Qty: 0 | Refills: 0 | Status: COMPLETED | OUTPATIENT
Start: 2017-10-11 | End: 2017-10-11

## 2017-10-11 RX ORDER — POTASSIUM CHLORIDE 20 MEQ
10 PACKET (EA) ORAL
Qty: 0 | Refills: 0 | Status: COMPLETED | OUTPATIENT
Start: 2017-10-11 | End: 2017-10-11

## 2017-10-11 RX ADMIN — Medication 100 GRAM(S): at 09:51

## 2017-10-11 RX ADMIN — CEFTRIAXONE 100 GRAM(S): 500 INJECTION, POWDER, FOR SOLUTION INTRAMUSCULAR; INTRAVENOUS at 16:40

## 2017-10-11 RX ADMIN — PANTOPRAZOLE SODIUM 40 MILLIGRAM(S): 20 TABLET, DELAYED RELEASE ORAL at 19:05

## 2017-10-11 RX ADMIN — Medication 100 MILLIEQUIVALENT(S): at 14:15

## 2017-10-11 RX ADMIN — Medication 20 MILLIEQUIVALENT(S): at 08:27

## 2017-10-11 RX ADMIN — Medication 100 MILLIEQUIVALENT(S): at 08:26

## 2017-10-11 RX ADMIN — PANTOPRAZOLE SODIUM 40 MILLIGRAM(S): 20 TABLET, DELAYED RELEASE ORAL at 05:37

## 2017-10-11 RX ADMIN — Medication 100 MILLIEQUIVALENT(S): at 11:14

## 2017-10-11 NOTE — PROGRESS NOTE ADULT - ASSESSMENT
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 Pt reports coffee ground emesis, nausea/vomiting. She had worsening left flank pain, 10/10 in severity. Denies subjective fever, but has chills. +n/v. Per daughter, pt vomited 3 times with blood. The vomitus appears "black". Per daughter, pt had urinary symptoms for the past week. In ER< she was noted febrile to 105F. She received vancomycin IV and ceftriaxone.     1. Febrile syndrome improving. Sepsis with E.coli. UTI with E.coli. Left kidney stone.  -leukocytosis is improving  -hypotension resolved  -f/u BC x 2, urine c/s  -off pressors   -on ceftriaxone 1 gm IV QD # 3  -tolerating abx well so far; no side effects noted  -continue abx coverage  -repeat BC x 2 collected  -pan management  -monitor temps  -f/u CBC  -supportive care  2. Other issues:   -care per medicine

## 2017-10-11 NOTE — PROGRESS NOTE ADULT - SUBJECTIVE AND OBJECTIVE BOX
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 Pt reports coffee ground emesis, nausea/vomiting. She had worsening left flank pain, 10/10 in severity. Denies subjective fever, but has chills. +n/v. Per daughter, pt vomited 3 times with blood. The vomitus appears "black". Per daughter, pt had urinary symptoms for the past week. In ER< she was noted febrile to 105F. She received vancomycin IV and ceftriaxone.     Alert and verbal  Hemodinamically improved, off pressors  Fever is down  OOB to chair    MEDICATIONS  (STANDING):  cefTRIAXone   IVPB      cefTRIAXone   IVPB 1 Gram(s) IV Intermittent every 24 hours  influenza   Vaccine 0.5 milliLiter(s) IntraMuscular once  pantoprazole  Injectable 40 milliGRAM(s) IV Push two times a day  phenylephrine    Infusion 0.5 MICROgram(s)/kG/Min (9.787 mL/Hr) IV Continuous <Continuous>  potassium chloride  10 mEq/100 mL IVPB 10 milliEquivalent(s) IV Intermittent every 1 hour  sodium chloride 0.9%. 1000 milliLiter(s) (100 mL/Hr) IV Continuous <Continuous>    MEDICATIONS  (PRN):  aluminum hydroxide/magnesium hydroxide/simethicone Suspension 30 milliLiter(s) Oral every 4 hours PRN Dyspepsia  ondansetron Injectable 4 milliGRAM(s) IV Push every 6 hours PRN Nausea  oxyCODONE    IR 5 milliGRAM(s) Oral every 6 hours PRN Moderate Pain (4 - 6)  senna 2 Tablet(s) Oral at bedtime PRN Constipation      Vital Signs Last 24 Hrs  T(C): 36.4 (11 Oct 2017 09:00), Max: 37.8 (10 Oct 2017 14:00)  T(F): 97.5 (11 Oct 2017 09:00), Max: 100 (10 Oct 2017 14:00)  HR: 70 (11 Oct 2017 09:00) (65 - 82)  BP: 127/66 (11 Oct 2017 09:00) (95/46 - 151/63)  BP(mean): 82 (11 Oct 2017 09:00) (58 - 101)  RR: 25 (11 Oct 2017 09:00) (15 - 27)  SpO2: 95% (11 Oct 2017 09:00) (89% - 100%)    Physical Exam:      Constitutional: frail looking  HEENT: NC/AT, EOMI, PERRLA  Neck: supple  Back: no tenderness  Respiratory: clear  Cardiovascular: S1S2 regular, no murmurs  Abdomen: soft, not tender, not distended, positive BS  Genitourinary: no LN palpable  Rectal: deferred  Musculoskeletal: no muscle tenderness, no joint swelling; has left flank tenderness  Extremities: no pedal edema  Neurological: AxOx3, moving all extremities  Skin: no rashes    Labs:                        9.2    22.5  )-----------( 111      ( 11 Oct 2017 05:10 )             28.9     10-11    148<H>  |  117<H>  |  12  ----------------------------<  89  3.0<L>   |  24  |  0.70    Ca    8.0<L>      11 Oct 2017 05:10  Mg     1.5     10-11                          9.5    32.8  )-----------( 137      ( 10 Oct 2017 05:25 )             29.6     10-10    149<H>  |  120<H>  |  20  ----------------------------<  41<LL>  3.1<L>   |  18<L>  |  0.76    Ca    7.8<L>      10 Oct 2017 05:25    TPro  7.5  /  Alb  3.3  /  TBili  0.7  /  DBili  x   /  AST  25  /  ALT  19  /  AlkPhos  90  10-                            12.8   2.6   )-----------( 197      ( 08 Oct 2017 15:26 )             39.5     10-08    138  |  110<H>  |  12  ----------------------------<  117<H>  3.7   |  16<L>  |  0.98    Ca    9.2      08 Oct 2017 14:41    TPro  7.5  /  Alb  3.3  /  TBili  0.7  /  DBili  x   /  AST  25  /  ALT  19  /  AlkPhos  90  10-08     LIVER FUNCTIONS - ( 08 Oct 2017 14:41 )  Alb: 3.3 g/dL / Pro: 7.5 gm/dL / ALK PHOS: 90 U/L / ALT: 19 U/L / AST: 25 U/L / GGT: x           Urinalysis Basic - ( 08 Oct 2017 17:05 )    Color: Yellow / Appearance: Slightly Turbid / S.010 / pH: x  Gluc: x / Ketone: Negative  / Bili: Negative / Urobili: Negative mg/dL   Blood: x / Protein: 100 mg/dL / Nitrite: Negative   Leuk Esterase: Moderate / RBC: 6-10 /HPF / WBC 11-25   Sq Epi: x / Non Sq Epi: Occasional / Bacteria: Many      Culture - Urine (collected 08 Oct 2017 17:05)  Source: .Urine Clean Catch (Midstream)  Preliminary Report (09 Oct 2017 18:27):    >100,000 CFU/ml Escherichia coli    Culture - Blood (collected 08 Oct 2017 16:25)  Source: .Blood None  Gram Stain (09 Oct 2017 06:56):    Growth in aerobic bottle:    Gram Negative Rods  Preliminary Report (10 Oct 2017 07:52):    Growth in aerobic bottle: Escherichia coli    ***Blood Panel PCR results on this specimen are available    approximately 3 hours after the Gram stain result.***    Gram stain, PCR, and/or culture results may not always    correspond due to difference in methodologies.    ************************************************************    This PCR assay was performed using [employer].    The following targets are tested for: Enterococcus,    vancomycin resistant enterococci, Listeria monocytogenes,    coagulase negative staphylococci, S. aureus,    methicillin resistant S. aureus, Streptococcus agalactiae    (Group B), S. pneumoniae, S. pyogenes (Group A),    Acinetobacter baumannii, Enterobacter cloacae, E. coli,    Klebsiella oxytoca, K. pneumoniae, Proteus sp.,    Serratia marcescens, Haemophilus influenzae,    Neisseria meningitidis, Pseudomonas aeruginosa, Candida    albicans, C. glabrata, C krusei, C parapsilosis,    C. tropicalis and the KPC resistance gene.  Organism: Blood Culture PCR (09 Oct 2017 08:34)  Organism: Blood Culture PCR (09 Oct 2017 08:34)      -  Escherichia coli: Detec      Method Type: PCR    Culture - Blood (collected 08 Oct 2017 16:25)  Source: .Blood None  Gram Stain (09 Oct 2017 16:06):    Growth in aerobic bottle: Gram Negative Rods  Preliminary Report (09 Oct 2017 16:07):    Growth in aerobic bottle: Gram Negative Rods        Radiology:    < from: CT Renal Stone Hunt (10.08.17 @ 14:01) >  moderate LEFT hydronephrosis and mild LEFT hydroureter. An   obstructing calculus is not identified.  No perinephric infiltration is   seen. There is a 3 mm calculus at the lower pole of the LEFT kidney.            < end of copied text >      Advanced directives addressed: full resuscitation

## 2017-10-11 NOTE — PROGRESS NOTE ADULT - SUBJECTIVE AND OBJECTIVE BOX
CC: weakness    HPI: 77 y.o. 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 Pt reports coffee ground emesis, nausea/vomiting. Pt states it started this AM and had worsening left flank pain, 10/10 in severity. Denies subjective fever, but has chills. +n/v. Denies CP, SOB, abd pain, dysuria or diarrhea. Denies LH, dizziness, or HA. Currently, pt is painfree.    Per daughter, pt vomited 3 times with blood. The vomitus appears "black". Per daughter, pt had urinary symptoms for the past week.    10/11 - Feels well today. No CP or SOB.    PMH: as above  PSH: as above and pyloric stenosis surgery (age 19), right mastectomy with lymph node dissection (2012)  Social Hx: denies tobacco, drugs, social EtOH  Family Hx: noncontrib to current presentation  ROS: per HPI (08 Oct 2017 20:15)      PAST MEDICAL & SURGICAL HISTORY:  Breast CA  H/O mastectomy      FAMILY HISTORY:      Social Hx:    Allergies    No Known Allergies    Intolerances        77y        ICU Vital Signs Last 24 Hrs  T(C): 36.4 (11 Oct 2017 09:00), Max: 37.8 (10 Oct 2017 14:00)  T(F): 97.5 (11 Oct 2017 09:00), Max: 100 (10 Oct 2017 14:00)  HR: 70 (11 Oct 2017 09:00) (65 - 82)  BP: 127/66 (11 Oct 2017 09:00) (95/46 - 151/63)  BP(mean): 82 (11 Oct 2017 09:00) (58 - 101)  ABP: --  ABP(mean): --  RR: 25 (11 Oct 2017 09:00) (15 - 27)  SpO2: 95% (11 Oct 2017 09:00) (89% - 100%)          I&O's Summary    10 Oct 2017 07:01  -  11 Oct 2017 07:00  --------------------------------------------------------  IN: 1233.7 mL / OUT: 2410 mL / NET: -1176.3 mL                              9.2    22.5  )-----------( 111      ( 11 Oct 2017 05:10 )             28.9       10-11    148<H>  |  117<H>  |  12  ----------------------------<  89  3.0<L>   |  24  |  0.70    Ca    8.0<L>      11 Oct 2017 05:10  Mg     1.5     10-11        CAPILLARY BLOOD GLUCOSE                                MEDICATIONS  (STANDING):  cefTRIAXone   IVPB      cefTRIAXone   IVPB 1 Gram(s) IV Intermittent every 24 hours  influenza   Vaccine 0.5 milliLiter(s) IntraMuscular once  pantoprazole  Injectable 40 milliGRAM(s) IV Push two times a day  phenylephrine    Infusion 0.5 MICROgram(s)/kG/Min (9.787 mL/Hr) IV Continuous <Continuous>  potassium chloride  10 mEq/100 mL IVPB 10 milliEquivalent(s) IV Intermittent every 1 hour  sodium chloride 0.9%. 1000 milliLiter(s) (100 mL/Hr) IV Continuous <Continuous>    MEDICATIONS  (PRN):  aluminum hydroxide/magnesium hydroxide/simethicone Suspension 30 milliLiter(s) Oral every 4 hours PRN Dyspepsia  ondansetron Injectable 4 milliGRAM(s) IV Push every 6 hours PRN Nausea  oxyCODONE    IR 5 milliGRAM(s) Oral every 6 hours PRN Moderate Pain (4 - 6)  senna 2 Tablet(s) Oral at bedtime PRN Constipation          DVT Prophylaxis:    Advanced Directives:  Discussed with:    Visit Information:    ** Time is exclusive of billed procedures and/or teaching and/or routine family updates.

## 2017-10-11 NOTE — CONSULT NOTE ADULT - SUBJECTIVE AND OBJECTIVE BOX
Patient is a 77y old  Female who presents with a chief complaint fever and sepsis      HPI:  77 y.o. female with PMH breast cancer,  s/p right mastectomy , no chemo or RT. followed by Dr Croft and is on Examestane.    presents with left flank pain, fever and chills. Pt reports coffee ground emesis, nausea/vomiting.  found to have urosepsis related to an obstructive calculus. now s/p nephrostomy tube.    now feels better    she is known to have subcutaneous nodules in the back of her neck which is being evaluated.    PMH: as above  PSH: as above and pyloric stenosis surgery (age 19), right mastectomy with lymph node dissection (2012)  Social Hx: denies tobacco, drugs, social EtOH  Family Hx: noncontrib to current presentation  ROS: per HPI (08 Oct 2017 20:15)      PAST MEDICAL & SURGICAL HISTORY:  Breast CA  H/O mastectomy  no documented mets      REVIEW OF SYSTEMS    General:	The patient feels well but tired.  no unexpected weight loss. Appetite good. no night sweats.  Skin: no Rash.	  ENT: no sore throat or oral pain. no difficulty with swallowing  Respiratory: No chest pain, No shortness of breath, no hemoptysis, no cough, no chest pain.  Cardiovascular : No chest pain. no palpitation.  Gastrointestinal:  No anorexia. no pain, no blood in stool.   Genitourinary: No hematuria , no dysuria	  Musculoskeletal: No myalgia, no arthralgia, no back pain, no joint swelling  Neurological: no headaches, no dizzyness, no weakness, no double vision. 	  Psychiatric: no change in mood. 	  Hematology/Lymphatics: weakness. 	  Endocrine:	no burning in urine or frequency  Allergic/Immunologic:	 no fever.     Allergies    No Known Allergies    Intolerances        FAMILY HISTORY:      Home Medications:  exemestane 25 mg oral tablet: 1 tab(s) orally once a day (08 Oct 2017 20:16)      MEDICATIONS  (STANDING):  cefTRIAXone   IVPB      cefTRIAXone   IVPB 1 Gram(s) IV Intermittent every 24 hours  influenza   Vaccine 0.5 milliLiter(s) IntraMuscular once  pantoprazole  Injectable 40 milliGRAM(s) IV Push two times a day  phenylephrine    Infusion 0.5 MICROgram(s)/kG/Min (9.787 mL/Hr) IV Continuous <Continuous>  potassium chloride  10 mEq/100 mL IVPB 10 milliEquivalent(s) IV Intermittent every 1 hour  sodium chloride 0.9%. 1000 milliLiter(s) (100 mL/Hr) IV Continuous <Continuous>    MEDICATIONS  (PRN):  aluminum hydroxide/magnesium hydroxide/simethicone Suspension 30 milliLiter(s) Oral every 4 hours PRN Dyspepsia  ondansetron Injectable 4 milliGRAM(s) IV Push every 6 hours PRN Nausea  oxyCODONE    IR 5 milliGRAM(s) Oral every 6 hours PRN Moderate Pain (4 - 6)  senna 2 Tablet(s) Oral at bedtime PRN Constipation      PHYSICAL EXAM:  Vital Signs Last 24 Hrs  T(C): 38 (11 Oct 2017 10:00), Max: 38 (11 Oct 2017 10:00)  T(F): 100.4 (11 Oct 2017 10:00), Max: 100.4 (11 Oct 2017 10:00)  HR: 83 (11 Oct 2017 10:00) (66 - 83)  BP: 136/60 (11 Oct 2017 10:00) (95/46 - 151/63)  BP(mean): 80 (11 Oct 2017 10:00) (58 - 101)  RR: 21 (11 Oct 2017 10:00) (15 - 27)  SpO2: 94% (11 Oct 2017 10:00) (89% - 100%)      Gen: well developed, well nourished, comfortable  HEENT: normocephalic/atraumatic, no conjunctival pallor, no scleral icterus, no oral thrush/mucosal bleeding/mucositis  Neck: supple, no masses, no JVD  Breasts: deferred  Back: nontender  Cardiovascular: heart sounds Normal S1S2, no murmurs/rubs/gallops  Respiratory: air entry normal and equal on both sides. no wheeze, no ronchi,   Gastrointestinal: BS+, soft, NT/ND, no masses, no splenomegaly, no hepatomegaly,   no evidence for ascites  Genitourinary: nephrostomy tube  Rectal: deferred  Extremities: no clubbing/cyanosis, no edema, no calf tenderness  Neurological:  Alert oriented X3 cranial nerves normal. no focal deficits  Skin: subcutaneous nodules in the back of the neck.? subcutaneous mets Vs cysts  Lymph Nodes:  no cervical/supraclavicular LAD, no axillary/groin LAD  Musculoskeletal:  full ROM  Psychiatric:  mood appears normal. not obviously anxious or depressed.        LABS:                        9.2    22.5  )-----------( 111      ( 11 Oct 2017 05:10 )             28.9     11 Oct 2017 05:10    148    |  117    |  12     ----------------------------<  89     3.0     |  24     |  0.70     Ca    8.0        11 Oct 2017 05:10  Mg     1.5       11 Oct 2017 05:10            Culture - Blood (collected 10-08-17 @ 16:25)  Source: .Blood None  Gram Stain (10-09-17 @ 06:56):    Growth in aerobic bottle:    Gram Negative Rods  Final Report (10-11-17 @ 09:08):    Growth in aerobic bottle: Escherichia coli          RADIOLOGY & ADDITIONAL STUDIES:

## 2017-10-11 NOTE — CONSULT NOTE ADULT - PROBLEM SELECTOR PROBLEM 1
UGI bleed
Malignant neoplasm of right breast in female, estrogen receptor positive, unspecified site of breast

## 2017-10-11 NOTE — PROGRESS NOTE ADULT - PROBLEM SELECTOR PLAN 1
Maintain left pcn.
Maintain left pcn. Pt will likely be discharged with pcn in place and will address as outpatient
Continue present Rx  Regular diet  protonix 40 PO QD
Soft diet  Protonix 40 IV BID

## 2017-10-11 NOTE — PROGRESS NOTE ADULT - ASSESSMENT
77F with Sepsis  Sepsis  E. Coli bacteremia  UTI  s/p PCN  UGIB    Plan:  Cont Close Monitoring  No Acute Resp issues  BP Stable  PO Diet  E. Coli Bacteremia  On IV Rocephin  Monitor renal function  Strict I/O's  DVT prophylaxis - Hep SQ

## 2017-10-11 NOTE — PROGRESS NOTE ADULT - PROBLEM SELECTOR PROBLEM 1
Hydronephrosis, unspecified hydronephrosis type
Hydronephrosis, unspecified hydronephrosis type
UGI bleed
UGI bleed

## 2017-10-11 NOTE — PROGRESS NOTE ADULT - SUBJECTIVE AND OBJECTIVE BOX
Interval History:    MEDICATIONS  (STANDING):  cefTRIAXone   IVPB      cefTRIAXone   IVPB 1 Gram(s) IV Intermittent every 24 hours  influenza   Vaccine 0.5 milliLiter(s) IntraMuscular once  magnesium sulfate  IVPB 1 Gram(s) IV Intermittent once  pantoprazole  Injectable 40 milliGRAM(s) IV Push two times a day  phenylephrine    Infusion 0.5 MICROgram(s)/kG/Min (9.787 mL/Hr) IV Continuous <Continuous>  potassium chloride  10 mEq/100 mL IVPB 10 milliEquivalent(s) IV Intermittent every 1 hour  sodium chloride 0.9%. 1000 milliLiter(s) (100 mL/Hr) IV Continuous <Continuous>    MEDICATIONS  (PRN):  aluminum hydroxide/magnesium hydroxide/simethicone Suspension 30 milliLiter(s) Oral every 4 hours PRN Dyspepsia  ondansetron Injectable 4 milliGRAM(s) IV Push every 6 hours PRN Nausea  oxyCODONE    IR 5 milliGRAM(s) Oral every 6 hours PRN Moderate Pain (4 - 6)  senna 2 Tablet(s) Oral at bedtime PRN Constipation      Daily     Daily   BMI: 22.5 (10-08 @ 13:01)  Change in Weight:  Vital Signs Last 24 Hrs  T(C): 36.9 (11 Oct 2017 04:00), Max: 37.8 (10 Oct 2017 14:00)  T(F): 98.4 (11 Oct 2017 04:00), Max: 100 (10 Oct 2017 14:00)  HR: 72 (11 Oct 2017 08:00) (62 - 82)  BP: 105/55 (11 Oct 2017 08:00) (95/46 - 151/63)  BP(mean): 68 (11 Oct 2017 08:00) (58 - 107)  RR: 22 (11 Oct 2017 08:00) (15 - 27)  SpO2: 90% (11 Oct 2017 08:00) (89% - 100%)  I&O's Detail    10 Oct 2017 07:01  -  11 Oct 2017 07:00  --------------------------------------------------------  IN:    IV PiggyBack: 350 mL    Oral Fluid: 800 mL    pantoprazole Infusion: 30 mL    phenylephrine   Infusion: 53.7 mL  Total IN: 1233.7 mL    OUT:    Drain: 1280 mL    Voided: 1130 mL  Total OUT: 2410 mL    Total NET: -1176.3 mL          PHYSICAL EXAM  General:  Well developed, well nourished, alert and active, no pallor, NAD.  HEENT:    Normal appearance of conjunctiva, ears, nose, lips, oropharynx, and oral mucosa, anicteric.  Neck:  No masses, no asymmetry.  Lymph Nodes:  No lymphadenopathy.   Cardiovascular:  RRR normal S1/S2, no murmur.  Respiratory:  CTA B/L, normal respiratory effort.   Abdominal:   soft, no masses or tenderness, normoactive BS, NT/ND, no HSM.  Extremities:   No clubbing or cyanosis, normal capillary refill, no edema.   Skin:   No rash, jaundice, lesions, eczema.   Musculoskeletal:  No joint swelling, erythema or tenderness.   Other:     Lab Results:                        9.2    22.5  )-----------( 111      ( 11 Oct 2017 05:10 )             28.9     10-11    148<H>  |  117<H>  |  12  ----------------------------<  89  3.0<L>   |  24  |  0.70    Ca    8.0<L>      11 Oct 2017 05:10  Mg     1.5     10-11              Stool Results:          RADIOLOGY RESULTS:    SURGICAL PATHOLOGY:

## 2017-10-11 NOTE — CONSULT NOTE ADULT - PROBLEM SELECTOR RECOMMENDATION 9
Continue Protonix infusion  EGD when stable unless patient rebleeds when it will be scheduled for today  Clear liquid diet
no obvious mets  subcutaneous nodules of unclear eitiology  rec;  continue aromasin  OP eval of the nodules

## 2017-10-12 LAB
ANION GAP SERPL CALC-SCNC: 7 MMOL/L — SIGNIFICANT CHANGE UP (ref 5–17)
BUN SERPL-MCNC: 6 MG/DL — LOW (ref 7–23)
CALCIUM SERPL-MCNC: 7.8 MG/DL — LOW (ref 8.5–10.1)
CHLORIDE SERPL-SCNC: 111 MMOL/L — HIGH (ref 96–108)
CO2 SERPL-SCNC: 29 MMOL/L — SIGNIFICANT CHANGE UP (ref 22–31)
CREAT SERPL-MCNC: 0.57 MG/DL — SIGNIFICANT CHANGE UP (ref 0.5–1.3)
GLUCOSE SERPL-MCNC: 91 MG/DL — SIGNIFICANT CHANGE UP (ref 70–99)
HCT VFR BLD CALC: 28.2 % — LOW (ref 34.5–45)
HGB BLD-MCNC: 9.5 G/DL — LOW (ref 11.5–15.5)
MCHC RBC-ENTMCNC: 28.9 PG — SIGNIFICANT CHANGE UP (ref 27–34)
MCHC RBC-ENTMCNC: 33.5 GM/DL — SIGNIFICANT CHANGE UP (ref 32–36)
MCV RBC AUTO: 86.2 FL — SIGNIFICANT CHANGE UP (ref 80–100)
PLATELET # BLD AUTO: 133 K/UL — LOW (ref 150–400)
POTASSIUM SERPL-MCNC: 3 MMOL/L — LOW (ref 3.5–5.3)
POTASSIUM SERPL-SCNC: 3 MMOL/L — LOW (ref 3.5–5.3)
RBC # BLD: 3.27 M/UL — LOW (ref 3.8–5.2)
RBC # FLD: 15 % — HIGH (ref 10.3–14.5)
SODIUM SERPL-SCNC: 147 MMOL/L — HIGH (ref 135–145)
WBC # BLD: 13.6 K/UL — HIGH (ref 3.8–10.5)
WBC # FLD AUTO: 13.6 K/UL — HIGH (ref 3.8–10.5)

## 2017-10-12 PROCEDURE — 71020: CPT | Mod: 26

## 2017-10-12 RX ORDER — SODIUM CHLORIDE 9 MG/ML
1000 INJECTION, SOLUTION INTRAVENOUS
Qty: 0 | Refills: 0 | Status: DISCONTINUED | OUTPATIENT
Start: 2017-10-12 | End: 2017-10-13

## 2017-10-12 RX ORDER — POTASSIUM CHLORIDE 20 MEQ
40 PACKET (EA) ORAL EVERY 4 HOURS
Qty: 0 | Refills: 0 | Status: COMPLETED | OUTPATIENT
Start: 2017-10-12 | End: 2017-10-12

## 2017-10-12 RX ORDER — PANTOPRAZOLE SODIUM 20 MG/1
40 TABLET, DELAYED RELEASE ORAL
Qty: 0 | Refills: 0 | Status: DISCONTINUED | OUTPATIENT
Start: 2017-10-12 | End: 2017-10-13

## 2017-10-12 RX ADMIN — Medication 40 MILLIEQUIVALENT(S): at 11:04

## 2017-10-12 RX ADMIN — CEFTRIAXONE 100 GRAM(S): 500 INJECTION, POWDER, FOR SOLUTION INTRAMUSCULAR; INTRAVENOUS at 17:07

## 2017-10-12 RX ADMIN — Medication 40 MILLIEQUIVALENT(S): at 15:12

## 2017-10-12 RX ADMIN — PANTOPRAZOLE SODIUM 40 MILLIGRAM(S): 20 TABLET, DELAYED RELEASE ORAL at 05:44

## 2017-10-12 NOTE — PROGRESS NOTE ADULT - SUBJECTIVE AND OBJECTIVE BOX
HOSPITALIST PROGRESS NOTE:  SUBJECTIVE:  PCP: PCP: Tani [phone]  Heme/Onc: Guerda    Chief Complaint: Patient is a 77y old  Female who presents with a chief complaint of SAH (08 Oct 2017 22:22)    HPI:  77 y.o. 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 Pt reports coffee ground emesis, nausea/vomiting. Pt states it started this AM and had worsening left flank pain, 10/10 in severity. Denies subjective fever, but has chills. +n/v. Denies CP, SOB, abd pain, dysuria or diarrhea. Denies LH, dizziness, or HA. Currently, pt is painfree.Per daughter, pt vomited 3 times with blood. The vomitus appears "black". Per daughter, pt had urinary symptoms for the past week. In ED, pt received 2L NS, vanco, ceftriaxone.    Patient was admittied to ICU and placed on pressors.  She was also got a Left PCN by Dr Dodge     10/12:     Allergies:  No Known Allergies    REVIEW OF SYSTEMS:  See HPI. All other review of systems is negative unless indicated above.     OBJECTIVE  Physical Exam:  Vital Signs:    Vital Signs Last 24 Hrs  T(C): 37.2 (12 Oct 2017 05:19), Max: 38 (11 Oct 2017 10:00)  T(F): 98.9 (12 Oct 2017 05:19), Max: 100.4 (11 Oct 2017 10:00)  HR: 67 (12 Oct 2017 05:19) (62 - 84)  BP: 142/57 (12 Oct 2017 05:19) (113/67 - 153/62)  BP(mean): 81 (11 Oct 2017 23:00) (75 - 81)  RR: 18 (12 Oct 2017 05:19) (17 - 25)  SpO2: 91% (12 Oct 2017 05:19) (89% - 98%)  I&O's Summary    11 Oct 2017 07:01  -  12 Oct 2017 07:00  --------------------------------------------------------  IN: 1050 mL / OUT: 2600 mL / NET: -1550 mL    Constitutional: NAD, awake and alert, well-developed  Neurological: AAO x 3, no focal deficits  HEENT: PERRLA, EOMI, MMM  Neck: Soft and supple, No LAD, No JVD  Respiratory: Breath sounds are clear bilaterally, No wheezing, rales or rhonchi  Cardiovascular: S1 and S2, regular rate and rhythm; no Murmurs, gallops or rubs  Gastrointestinal: Bowel Sounds present, soft, nontender, nondistended, no guarding, no rebound tenderness  Back: No CVA tenderness   Extremities: No peripheral edema  Vascular: 2+ peripheral pulses  Musculoskeletal: 5/5 strength b/l upper and lower extremities   Skin: No rashes  Breast: Deferred  Rectal: Deferred    MEDICATIONS  (STANDING):  cefTRIAXone   IVPB      cefTRIAXone   IVPB 1 Gram(s) IV Intermittent every 24 hours  exemestane 25 milliGRAM(s) Oral after breakfast  influenza   Vaccine 0.5 milliLiter(s) IntraMuscular once  pantoprazole  Injectable 40 milliGRAM(s) IV Push two times a day      LABS: All Labs Reviewed:                        9.5    13.6  )-----------( 133      ( 12 Oct 2017 07:17 )             28.2     10-12    147<H>  |  111<H>  |  6<L>  ----------------------------<  91  3.0<L>   |  29  |  0.57    Ca    7.8<L>      12 Oct 2017 07:17  Mg     1.5     10-11    Blood Culture:   10-09 @ 14:30  Organism --  Gram Stain Blood -- Gram Stain --  Specimen Source .Urine None  Culture-Blood --    10-08 @ 17:05  Organism Escherichia coli  Gram Stain Blood -- Gram Stain --  Specimen Source .Urine Clean Catch (Midstream)  Culture-Blood --    10-08 @ 16:25  Organism Escherichia coli  Gram Stain Blood -- Gram Stain   Growth in aerobic bottle: Gram Negative Rods  Specimen Source .Blood None  Culture-Blood --    RADIOLOGY/EKG:    < from: Xray Chest 1 View AP/PA. (10.08.17 @ 22:27) >    Impression:  1. No evidence of acute pulmonary disease.       < end of copied text >    < from: CT Abdomen and Pelvis No Cont (10.09.17 @ 10:41) >    IMPRESSION:     Improving mild left hydroureteronephrosis without obstructing calculus   identified. Stable punctate left lower pole calculus. CT urogram with and   without contrast may provide additional information.    < end of copied text >    < from: CT Renal Stone Hunt (10.08.17 @ 14:01) >      IMPRESSION:   moderate LEFT hydronephrosis and mild LEFT hydroureter. An   obstructing calculus is not identified.  No perinephric infiltration is   seen. There is a 3 mm calculus at the lower pole of the LEFT kidney.            < end of copied text > HOSPITALIST PROGRESS NOTE:  SUBJECTIVE:  PCP: PCP: Tani [phone]  Heme/Onc: Guerda    Chief Complaint: Patient is a 77y old  Female who presents with a chief complaint of SAH (08 Oct 2017 22:22)    HPI:  77 y.o. 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 Pt reports coffee ground emesis, nausea/vomiting. Pt states it started this AM and had worsening left flank pain, 10/10 in severity. Denies subjective fever, but has chills. +n/v. Denies CP, SOB, abd pain, dysuria or diarrhea. Denies LH, dizziness, or HA. Currently, pt is painfree.Per daughter, pt vomited 3 times with blood. The vomitus appears "black". Per daughter, pt had urinary symptoms for the past week. In ED, pt received 2L NS, vanco, ceftriaxone.    Patient was admittied to ICU and placed on pressors.  She was also got a Left PCN by Dr Dodge     10/12: Feel better. Daughter at bedside. PCN draining.     Allergies:  No Known Allergies    REVIEW OF SYSTEMS:  See HPI. All other review of systems is negative unless indicated above.     OBJECTIVE  Physical Exam:  Vital Signs Last 24 Hrs  T(C): 36.9 (12 Oct 2017 11:02), Max: 37.7 (11 Oct 2017 21:00)  T(F): 98.4 (12 Oct 2017 11:02), Max: 99.9 (11 Oct 2017 21:00)  HR: 68 (12 Oct 2017 11:02) (62 - 84)  BP: 140/55 (12 Oct 2017 11:02) (124/67 - 153/62)  BP(mean): 81 (11 Oct 2017 23:00) (75 - 81)  RR: 18 (12 Oct 2017 11:02) (17 - 25)  SpO2: 95% (12 Oct 2017 11:02) (89% - 97%)    Constitutional: NAD, awake and alert, well-developed  Neurological: AAO x 3, no focal deficits  HEENT: PERRLA, EOMI, MMM  Neck: Soft and supple, No LAD, No JVD  Respiratory: Breath sounds are clear bilaterally, No wheezing, rales or rhonchi  Cardiovascular: S1 and S2, regular rate and rhythm; no Murmurs, gallops or rubs  Gastrointestinal: Bowel Sounds present, soft, nontender, nondistended, no guarding, no rebound tenderness  Back: No CVA tenderness; +Left PCN  Extremities: No peripheral edema  Vascular: 2+ peripheral pulses  Musculoskeletal: 5/5 strength b/l upper and lower extremities   Skin: No rashes  Breast: Deferred  Rectal: Deferred    MEDICATIONS  (STANDING):  cefTRIAXone   IVPB      cefTRIAXone   IVPB 1 Gram(s) IV Intermittent every 24 hours  exemestane 25 milliGRAM(s) Oral after breakfast  influenza   Vaccine 0.5 milliLiter(s) IntraMuscular once  pantoprazole  Injectable 40 milliGRAM(s) IV Push two times a day    Lab Results:  CBC  CBC Full  -  ( 12 Oct 2017 07:17 )  WBC Count : 13.6 K/uL  Hemoglobin : 9.5 g/dL  Hematocrit : 28.2 %  Platelet Count - Automated : 133 K/uL  Mean Cell Volume : 86.2 fl  Mean Cell Hemoglobin : 28.9 pg  Mean Cell Hemoglobin Concentration : 33.5 gm/dL  Auto Neutrophil # : x  Auto Lymphocyte # : x  Auto Monocyte # : x  Auto Eosinophil # : x  Auto Basophil # : x  Auto Neutrophil % : x  Auto Lymphocyte % : x  Auto Monocyte % : x  Auto Eosinophil % : x  Auto Basophil % : x    .		Differential:	[] Automated		[] Manual  Chemistry                        9.5    13.6  )-----------( 133      ( 12 Oct 2017 07:17 )             28.2     10-12    147<H>  |  111<H>  |  6<L>  ----------------------------<  91  3.0<L>   |  29  |  0.57    Ca    7.8<L>      12 Oct 2017 07:17  Mg     1.5     10-11      MICROBIOLOGY/CULTURES:  Culture Results:   No growth to date. (10-11 @ 05:10)  Culture Results:   No growth at 48 hours (10-09 @ 14:30)  Culture Results:   >100,000 CFU/ml Escherichia coli (10-08 @ 17:05)  Culture Results:   Growth in aerobic bottle: Escherichia coli  See previous culture [id number] (10-08 @ 16:25)  Culture Results:   Growth in aerobic bottle: Escherichia coli  ***Blood Panel PCR results on this specimen are available  approximately 3 hours after the Gram stain result.***  Gram stain, PCR, and/or culture results may not always  correspond due to difference in methodologies.  ************************************************************  This PCR assay was performed using [employer].  The following targets are tested for: Enterococcus,  vancomycin resistant enterococci, Listeria monocytogenes,  coagulase negative staphylococci, S. aureus,  methicillin resistant S. aureus, Streptococcus agalactiae  (Group B), S. pneumoniae, S. pyogenes (Group A),  Acinetobacter baumannii, Enterobacter cloacae, E. coli,  Klebsiella oxytoca, K. pneumoniae, Proteus sp.,  Serratia marcescens, Haemophilus influenzae,  Neisseria meningitidis, Pseudomonas aeruginosa, Candida  albicans, C. glabrata, C krusei, C parapsilosis,  C. tropicalis and the KPC resistance gene. (10-08 @ 16:25)    RADIOLOGY/EKG:    < from: Xray Chest 1 View AP/PA. (10.08.17 @ 22:27) >    Impression:  1. No evidence of acute pulmonary disease.       < end of copied text >    < from: CT Abdomen and Pelvis No Cont (10.09.17 @ 10:41) >    IMPRESSION:     Improving mild left hydroureteronephrosis without obstructing calculus   identified. Stable punctate left lower pole calculus. CT urogram with and   without contrast may provide additional information.    < end of copied text >    < from: CT Renal Stone Hunt (10.08.17 @ 14:01) >      IMPRESSION:   moderate LEFT hydronephrosis and mild LEFT hydroureter. An   obstructing calculus is not identified.  No perinephric infiltration is   seen. There is a 3 mm calculus at the lower pole of the LEFT kidney.            < end of copied text >

## 2017-10-12 NOTE — PROGRESS NOTE ADULT - ASSESSMENT
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 Pt reports coffee ground emesis, nausea/vomiting. She had worsening left flank pain, 10/10 in severity. Denies subjective fever, but has chills. +n/v. Per daughter, pt vomited 3 times with blood. The vomitus appears "black". Per daughter, pt had urinary symptoms for the past week. In ER< she was noted febrile to 105F. She received vancomycin IV and ceftriaxone.     1. Febrile syndrome improving. Sepsis with E.coli improving. UTI with E.coli. Left kidney stone.  -leukocytosis is improving  -hypotension resolved  -repeat BC x 2 are negative to date  -on ceftriaxone 1 gm IV QD # 4  -tolerating abx well so far; no side effects noted  -continue abx coverage  -pan management  -monitor temps  -f/u CBC  -supportive care  2. Other issues:   -care per medicine

## 2017-10-12 NOTE — PROGRESS NOTE ADULT - SUBJECTIVE AND OBJECTIVE BOX
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 Pt reports coffee ground emesis, nausea/vomiting. She had worsening left flank pain, 10/10 in severity. Denies subjective fever, but has chills. +n/v. Per daughter, pt vomited 3 times with blood. The vomitus appears "black". Per daughter, pt had urinary symptoms for the past week. In ER< she was noted febrile to 105F. She received vancomycin IV and ceftriaxone.     Alert and verbal  Denies pain  Fever is down Tmax 100.1F  OOB to chair    MEDICATIONS  (STANDING):  cefTRIAXone   IVPB      cefTRIAXone   IVPB 1 Gram(s) IV Intermittent every 24 hours  exemestane 25 milliGRAM(s) Oral after breakfast  influenza   Vaccine 0.5 milliLiter(s) IntraMuscular once  pantoprazole    Tablet 40 milliGRAM(s) Oral before breakfast  potassium chloride    Tablet ER 40 milliEquivalent(s) Oral every 4 hours    MEDICATIONS  (PRN):  aluminum hydroxide/magnesium hydroxide/simethicone Suspension 30 milliLiter(s) Oral every 4 hours PRN Dyspepsia  ondansetron Injectable 4 milliGRAM(s) IV Push every 6 hours PRN Nausea  oxyCODONE    IR 5 milliGRAM(s) Oral every 6 hours PRN Moderate Pain (4 - 6)  senna 2 Tablet(s) Oral at bedtime PRN Constipation      Vital Signs Last 24 Hrs  T(C): 36.9 (12 Oct 2017 11:02), Max: 37.8 (11 Oct 2017 13:00)  T(F): 98.4 (12 Oct 2017 11:02), Max: 100.1 (11 Oct 2017 13:00)  HR: 68 (12 Oct 2017 11:02) (62 - 84)  BP: 140/55 (12 Oct 2017 11:02) (117/73 - 153/62)  BP(mean): 81 (11 Oct 2017 23:00) (75 - 81)  RR: 18 (12 Oct 2017 11:02) (17 - 25)  SpO2: 95% (12 Oct 2017 11:02) (89% - 97%)    Physical Exam:      Constitutional: frail looking  HEENT: NC/AT, EOMI, PERRLA  Neck: supple  Back: no tenderness  Respiratory: clear  Cardiovascular: S1S2 regular, no murmurs  Abdomen: soft, not tender, not distended, positive BS  Genitourinary: no LN palpable  Rectal: deferred  Musculoskeletal: no muscle tenderness, no joint swelling  Extremities: no pedal edema  Neurological: AxOx3, moving all extremities  Skin: no rashes    Labs:                        9.5    13.6  )-----------( 133      ( 12 Oct 2017 07:17 )             28.2     10-12    147<H>  |  111<H>  |  6<L>  ----------------------------<  91  3.0<L>   |  29  |  0.57    Ca    7.8<L>      12 Oct 2017 07:17  Mg     1.5     10-11                          9.2    22.5  )-----------( 111      ( 11 Oct 2017 05:10 )             28.9     10-11    148<H>  |  117<H>  |  12  ----------------------------<  89  3.0<L>   |  24  |  0.70    Ca    8.0<L>      11 Oct 2017 05:10  Mg     1.5     10-11                          9.5    32.8  )-----------( 137      ( 10 Oct 2017 05:25 )             29.6     10-10    149<H>  |  120<H>  |  20  ----------------------------<  41<LL>  3.1<L>   |  18<L>  |  0.76    Ca    7.8<L>      10 Oct 2017 05:25    TPro  7.5  /  Alb  3.3  /  TBili  0.7  /  DBili  x   /  AST  25  /  ALT  19  /  AlkPhos  90  10-08                            12.8   2.6   )-----------( 197      ( 08 Oct 2017 15:26 )             39.5     10-08    138  |  110<H>  |  12  ----------------------------<  117<H>  3.7   |  16<L>  |  0.98    Ca    9.2      08 Oct 2017 14:41    TPro  7.5  /  Alb  3.3  /  TBili  0.7  /  DBili  x   /  AST  25  /  ALT  19  /  AlkPhos  90  10-08     LIVER FUNCTIONS - ( 08 Oct 2017 14:41 )  Alb: 3.3 g/dL / Pro: 7.5 gm/dL / ALK PHOS: 90 U/L / ALT: 19 U/L / AST: 25 U/L / GGT: x           Urinalysis Basic - ( 08 Oct 2017 17:05 )    Color: Yellow / Appearance: Slightly Turbid / S.010 / pH: x  Gluc: x / Ketone: Negative  / Bili: Negative / Urobili: Negative mg/dL   Blood: x / Protein: 100 mg/dL / Nitrite: Negative   Leuk Esterase: Moderate / RBC: 6-10 /HPF / WBC 11-25   Sq Epi: x / Non Sq Epi: Occasional / Bacteria: Many      Culture - Urine (collected 08 Oct 2017 17:05)  Source: .Urine Clean Catch (Midstream)  Preliminary Report (09 Oct 2017 18:27):    >100,000 CFU/ml Escherichia coli    Culture - Blood (collected 08 Oct 2017 16:25)  Source: .Blood None  Gram Stain (09 Oct 2017 06:56):    Growth in aerobic bottle:    Gram Negative Rods  Preliminary Report (10 Oct 2017 07:52):    Growth in aerobic bottle: Escherichia coli    ***Blood Panel PCR results on this specimen are available    approximately 3 hours after the Gram stain result.***    Gram stain, PCR, and/or culture results may not always    correspond due to difference in methodologies.    ************************************************************    This PCR assay was performed using [employer].    The following targets are tested for: Enterococcus,    vancomycin resistant enterococci, Listeria monocytogenes,    coagulase negative staphylococci, S. aureus,    methicillin resistant S. aureus, Streptococcus agalactiae    (Group B), S. pneumoniae, S. pyogenes (Group A),    Acinetobacter baumannii, Enterobacter cloacae, E. coli,    Klebsiella oxytoca, K. pneumoniae, Proteus sp.,    Serratia marcescens, Haemophilus influenzae,    Neisseria meningitidis, Pseudomonas aeruginosa, Candida    albicans, C. glabrata, C krusei, C parapsilosis,    C. tropicalis and the KPC resistance gene.  Organism: Blood Culture PCR (09 Oct 2017 08:34)  Organism: Blood Culture PCR (09 Oct 2017 08:34)      -  Escherichia coli: Detec      Method Type: PCR    Culture - Blood (collected 08 Oct 2017 16:25)  Source: .Blood None  Gram Stain (09 Oct 2017 16:06):    Growth in aerobic bottle: Gram Negative Rods  Preliminary Report (09 Oct 2017 16:07):    Growth in aerobic bottle: Gram Negative Rods    Culture - Blood in AM (10.11.17 @ 05:10)    Specimen Source: .Blood None    Culture Results:   No growth to date.        Radiology:    < from: CT Renal Stone Hunt (10.08.17 @ 14:01) >  moderate LEFT hydronephrosis and mild LEFT hydroureter. An   obstructing calculus is not identified.  No perinephric infiltration is   seen. There is a 3 mm calculus at the lower pole of the LEFT kidney.            < end of copied text >      Advanced directives addressed: full resuscitation

## 2017-10-12 NOTE — PROGRESS NOTE ADULT - ASSESSMENT
#severe sepsis/septic shock 2ndry to Ecoli UTI   #Obstructive Calculus, moderate left hydronephrosis, mild left hydroureter S/P left PCN  -transfer from ICU 10/11  -S/P pressors  -lactate 4.8 - resolved   -Leukocytosis - Improving  -Blood and Urine Cx - EColi, sensitive to rocephin  -iv ceftriaxone #4  -ID consult appreciated   -Urology consult appreciated  - Pt will likely be discharged with pcn in place and monitor as outpatient.     #Hypokalemia  -replete and monitor    #coffee ground emesis, UGIB - resolved   -monitor H/H  -protonix PO QD  -GI consult appreciated    #breast cancer s/p R mastectomy;   #subcutaneous nodules in the neck of unclear etiology  -pt on exemestane 25mg daily  -Hemeonc consult appreciated   -OP eval of the nodules.    #DVT ppx  -SCDs due to possible UGIB #severe sepsis/septic shock 2ndry to Ecoli UTI   #Obstructive Calculus, moderate left hydronephrosis, mild left hydroureter S/P left PCN  -transfer from ICU 10/11  -S/P pressors  -lactate 4.8 - resolved   -Leukocytosis - Improving  -Blood and Urine Cx - EColi, sensitive to rocephin  -repeat Blood Cx X 2 NGTD  -iv ceftriaxone #4  -ID consult appreciated   -Urology consult appreciated  - Pt will likely be discharged with pcn in place and monitor as outpatient.     #Hypokalemia  -replete and monitor    #Hypernatremia  -D5W - FU BMP    #?Hypoxia R/O Fluid overload  -FU CXR     #coffee ground emesis, UGIB - resolved   -monitor H/H  -protonix PO QD  -GI consult appreciated    #breast cancer s/p R mastectomy;   #subcutaneous nodules in the neck of unclear etiology  -pt on exemestane 25mg daily  -Hemeonc consult appreciated   -OP eval of the nodules.    #DVT ppx  -SCDs due to possible UGIB    #Disposition - possible D/C in 1-2 days; FU with ID

## 2017-10-13 ENCOUNTER — TRANSCRIPTION ENCOUNTER (OUTPATIENT)
Age: 77
End: 2017-10-13

## 2017-10-13 VITALS
OXYGEN SATURATION: 92 % | SYSTOLIC BLOOD PRESSURE: 120 MMHG | HEART RATE: 80 BPM | TEMPERATURE: 98 F | RESPIRATION RATE: 17 BRPM | DIASTOLIC BLOOD PRESSURE: 64 MMHG

## 2017-10-13 LAB
ANION GAP SERPL CALC-SCNC: 7 MMOL/L — SIGNIFICANT CHANGE UP (ref 5–17)
BUN SERPL-MCNC: 7 MG/DL — SIGNIFICANT CHANGE UP (ref 7–23)
CALCIUM SERPL-MCNC: 8.1 MG/DL — LOW (ref 8.5–10.1)
CHLORIDE SERPL-SCNC: 107 MMOL/L — SIGNIFICANT CHANGE UP (ref 96–108)
CO2 SERPL-SCNC: 30 MMOL/L — SIGNIFICANT CHANGE UP (ref 22–31)
CREAT SERPL-MCNC: 0.5 MG/DL — SIGNIFICANT CHANGE UP (ref 0.5–1.3)
GLUCOSE SERPL-MCNC: 76 MG/DL — SIGNIFICANT CHANGE UP (ref 70–99)
HCT VFR BLD CALC: 32.1 % — LOW (ref 34.5–45)
HGB BLD-MCNC: 10.4 G/DL — LOW (ref 11.5–15.5)
MCHC RBC-ENTMCNC: 28.2 PG — SIGNIFICANT CHANGE UP (ref 27–34)
MCHC RBC-ENTMCNC: 32.5 GM/DL — SIGNIFICANT CHANGE UP (ref 32–36)
MCV RBC AUTO: 86.8 FL — SIGNIFICANT CHANGE UP (ref 80–100)
PLATELET # BLD AUTO: 138 K/UL — LOW (ref 150–400)
POTASSIUM SERPL-MCNC: 3.4 MMOL/L — LOW (ref 3.5–5.3)
POTASSIUM SERPL-SCNC: 3.4 MMOL/L — LOW (ref 3.5–5.3)
RBC # BLD: 3.69 M/UL — LOW (ref 3.8–5.2)
RBC # FLD: 15.2 % — HIGH (ref 10.3–14.5)
SODIUM SERPL-SCNC: 144 MMOL/L — SIGNIFICANT CHANGE UP (ref 135–145)
WBC # BLD: 8.7 K/UL — SIGNIFICANT CHANGE UP (ref 3.8–10.5)
WBC # FLD AUTO: 8.7 K/UL — SIGNIFICANT CHANGE UP (ref 3.8–10.5)

## 2017-10-13 RX ORDER — CEFTRIAXONE 500 MG/1
1 INJECTION, POWDER, FOR SOLUTION INTRAMUSCULAR; INTRAVENOUS EVERY 24 HOURS
Qty: 0 | Refills: 0 | Status: DISCONTINUED | OUTPATIENT
Start: 2017-10-13 | End: 2017-10-13

## 2017-10-13 RX ORDER — PANTOPRAZOLE SODIUM 20 MG/1
1 TABLET, DELAYED RELEASE ORAL
Qty: 30 | Refills: 0 | OUTPATIENT
Start: 2017-10-13 | End: 2017-11-12

## 2017-10-13 RX ORDER — POTASSIUM CHLORIDE 20 MEQ
20 PACKET (EA) ORAL ONCE
Qty: 0 | Refills: 0 | Status: COMPLETED | OUTPATIENT
Start: 2017-10-13 | End: 2017-10-13

## 2017-10-13 RX ORDER — CEFUROXIME AXETIL 250 MG
1 TABLET ORAL
Qty: 20 | Refills: 0 | OUTPATIENT
Start: 2017-10-13 | End: 2017-10-23

## 2017-10-13 RX ADMIN — SODIUM CHLORIDE 75 MILLILITER(S): 9 INJECTION, SOLUTION INTRAVENOUS at 09:37

## 2017-10-13 RX ADMIN — CEFTRIAXONE 100 GRAM(S): 500 INJECTION, POWDER, FOR SOLUTION INTRAMUSCULAR; INTRAVENOUS at 12:24

## 2017-10-13 RX ADMIN — PANTOPRAZOLE SODIUM 40 MILLIGRAM(S): 20 TABLET, DELAYED RELEASE ORAL at 07:13

## 2017-10-13 RX ADMIN — Medication 20 MILLIEQUIVALENT(S): at 11:29

## 2017-10-13 RX ADMIN — EXEMESTANE 25 MILLIGRAM(S): 25 TABLET, SUGAR COATED ORAL at 09:37

## 2017-10-13 NOTE — PROGRESS NOTE ADULT - ASSESSMENT
#severe sepsis/septic shock 2ndry to Ecoli UTI   #Obstructive Calculus, moderate left hydronephrosis, mild left hydroureter S/P left PCN  -transfer from ICU 10/11  -S/P pressors  -lactate 4.8 - resolved   -Leukocytosis - resolved  -Blood and Urine Cx - EColi, sensitive to rocephin  -repeat Blood Cx X 2 NGTD  -iv ceftriaxone #5  -ID consult appreciated   -Urology consult appreciated  - Pt will likely be discharged with pcn in place and monitor as outpatient.     #Hypokalemia  -replete and monitor    #Hypernatremia  -D5W - FU BMP    #?Hypoxia R/O Fluid overload  -FU CXR     #coffee ground emesis, UGIB - resolved   -monitor H/H  -protonix PO QD  -GI consult appreciated    #breast cancer s/p R mastectomy;   #subcutaneous nodules in the neck of unclear etiology  -pt on exemestane 25mg daily  -Hemeonc consult appreciated   -OP eval of the nodules.    #DVT ppx  -SCDs due to possible UGIB    #Disposition - f/u with ID, possible d/c today on PO abx

## 2017-10-13 NOTE — DISCHARGE NOTE ADULT - PLAN OF CARE
treat infection s/p 5 days of ceftriaxone, complete 10 more days of ceftin 2x a day. f/u with Dr. Carver urology for nephrostomy tube removal and to further manage kidney stones next week continue protonix, f/u with GI Dr. Ibarra for possible egd soon

## 2017-10-13 NOTE — PROGRESS NOTE ADULT - SUBJECTIVE AND OBJECTIVE BOX
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 Pt reports coffee ground emesis, nausea/vomiting. She had worsening left flank pain, 10/10 in severity. Denies subjective fever, but has chills. +n/v. Per daughter, pt vomited 3 times with blood. The vomitus appears "black". Per daughter, pt had urinary symptoms for the past week. In ER< she was noted febrile to 105F. She received vancomycin IV and ceftriaxone.     Alert and verbal  Denies pain  No further fever or chills  OOB to chair    MEDICATIONS  (STANDING):  cefTRIAXone   IVPB 1 Gram(s) IV Intermittent every 24 hours  exemestane 25 milliGRAM(s) Oral after breakfast  influenza   Vaccine 0.5 milliLiter(s) IntraMuscular once  pantoprazole    Tablet 40 milliGRAM(s) Oral before breakfast  potassium chloride    Tablet ER 20 milliEquivalent(s) Oral once    MEDICATIONS  (PRN):  aluminum hydroxide/magnesium hydroxide/simethicone Suspension 30 milliLiter(s) Oral every 4 hours PRN Dyspepsia  ondansetron Injectable 4 milliGRAM(s) IV Push every 6 hours PRN Nausea  oxyCODONE    IR 5 milliGRAM(s) Oral every 6 hours PRN Moderate Pain (4 - 6)  senna 2 Tablet(s) Oral at bedtime PRN Constipation      Vital Signs Last 24 Hrs  T(C): 37 (12 Oct 2017 20:51), Max: 37.1 (12 Oct 2017 16:59)  T(F): 98.6 (12 Oct 2017 20:51), Max: 98.8 (12 Oct 2017 16:59)  HR: 66 (12 Oct 2017 20:51) (66 - 68)  BP: 121/64 (12 Oct 2017 20:51) (121/64 - 125/61)  BP(mean): --  RR: 17 (12 Oct 2017 20:51) (17 - 17)  SpO2: 95% (12 Oct 2017 20:51) (93% - 95%)    Physical Exam:      Constitutional: frail looking  HEENT: NC/AT, EOMI, PERRLA  Neck: supple  Back: no tenderness  Respiratory: clear  Cardiovascular: S1S2 regular, no murmurs  Abdomen: soft, not tender, not distended, positive BS  Genitourinary: no LN palpable  Rectal: deferred  Musculoskeletal: no muscle tenderness, no joint swelling; left flank nephrostomy site clean  Extremities: no pedal edema  Neurological: AxOx3, moving all extremities  Skin: no rashes    Labs:                        10.4   8.7   )-----------( 138      ( 13 Oct 2017 07:44 )             32.1     10-    144  |  107  |  7   ----------------------------<  76  3.4<L>   |  30  |  0.50    Ca    8.1<L>      13 Oct 2017 07:44                          9.5    13.6  )-----------( 133      ( 12 Oct 2017 07:17 )             28.2     10-12    147<H>  |  111<H>  |  6<L>  ----------------------------<  91  3.0<L>   |  29  |  0.57    Ca    7.8<L>      12 Oct 2017 07:17  Mg     1.5     10-                          9.2    22.5  )-----------( 111      ( 11 Oct 2017 05:10 )             28.9     10-11    148<H>  |  117<H>  |  12  ----------------------------<  89  3.0<L>   |  24  |  0.70    Ca    8.0<L>      11 Oct 2017 05:10  Mg     1.5     10-11                          9.5    32.8  )-----------( 137      ( 10 Oct 2017 05:25 )             29.6     10-10    149<H>  |  120<H>  |  20  ----------------------------<  41<LL>  3.1<L>   |  18<L>  |  0.76    Ca    7.8<L>      10 Oct 2017 05:25    TPro  7.5  /  Alb  3.3  /  TBili  0.7  /  DBili  x   /  AST  25  /  ALT  19  /  AlkPhos  90  10-08                            12.8   2.6   )-----------( 197      ( 08 Oct 2017 15:26 )             39.5     10    138  |  110<H>  |  12  ----------------------------<  117<H>  3.7   |  16<L>  |  0.98    Ca    9.2      08 Oct 2017 14:41    TPro  7.5  /  Alb  3.3  /  TBili  0.7  /  DBili  x   /  AST  25  /  ALT  19  /  AlkPhos  90  10-08     LIVER FUNCTIONS - ( 08 Oct 2017 14:41 )  Alb: 3.3 g/dL / Pro: 7.5 gm/dL / ALK PHOS: 90 U/L / ALT: 19 U/L / AST: 25 U/L / GGT: x           Urinalysis Basic - ( 08 Oct 2017 17:05 )    Color: Yellow / Appearance: Slightly Turbid / S.010 / pH: x  Gluc: x / Ketone: Negative  / Bili: Negative / Urobili: Negative mg/dL   Blood: x / Protein: 100 mg/dL / Nitrite: Negative   Leuk Esterase: Moderate / RBC: 6-10 /HPF / WBC 11-25   Sq Epi: x / Non Sq Epi: Occasional / Bacteria: Many      Culture - Urine (collected 08 Oct 2017 17:05)  Source: .Urine Clean Catch (Midstream)  Preliminary Report (09 Oct 2017 18:27):    >100,000 CFU/ml Escherichia coli    Culture - Blood (collected 08 Oct 2017 16:25)  Source: .Blood None  Gram Stain (09 Oct 2017 06:56):    Growth in aerobic bottle:    Gram Negative Rods  Preliminary Report (10 Oct 2017 07:52):    Growth in aerobic bottle: Escherichia coli    ***Blood Panel PCR results on this specimen are available    approximately 3 hours after the Gram stain result.***    Gram stain, PCR, and/or culture results may not always    correspond due to difference in methodologies.    ************************************************************    This PCR assay was performed using [employer].    The following targets are tested for: Enterococcus,    vancomycin resistant enterococci, Listeria monocytogenes,    coagulase negative staphylococci, S. aureus,    methicillin resistant S. aureus, Streptococcus agalactiae    (Group B), S. pneumoniae, S. pyogenes (Group A),    Acinetobacter baumannii, Enterobacter cloacae, E. coli,    Klebsiella oxytoca, K. pneumoniae, Proteus sp.,    Serratia marcescens, Haemophilus influenzae,    Neisseria meningitidis, Pseudomonas aeruginosa, Candida    albicans, C. glabrata, C krusei, C parapsilosis,    C. tropicalis and the KPC resistance gene.  Organism: Blood Culture PCR (09 Oct 2017 08:34)  Organism: Blood Culture PCR (09 Oct 2017 08:34)      -  Escherichia coli: Detec      Method Type: PCR    Culture - Blood (collected 08 Oct 2017 16:25)  Source: .Blood None  Gram Stain (09 Oct 2017 16:06):    Growth in aerobic bottle: Gram Negative Rods  Preliminary Report (09 Oct 2017 16:07):    Growth in aerobic bottle: Gram Negative Rods    Culture - Blood in AM (10.11.17 @ 05:10)    Specimen Source: .Blood None    Culture Results:   No growth to date.        Radiology:    < from: CT Renal Stone Hunt (10.08.17 @ 14:01) >  moderate LEFT hydronephrosis and mild LEFT hydroureter. An   obstructing calculus is not identified.  No perinephric infiltration is   seen. There is a 3 mm calculus at the lower pole of the LEFT kidney.            < end of copied text >      Advanced directives addressed: full resuscitation

## 2017-10-13 NOTE — DISCHARGE NOTE ADULT - CARE PROVIDERS DIRECT ADDRESSES
,DirectAddress_Unknown,[email or web address],DirectAddress_Unknown ,[email or web address],DirectAddress_Unknown,DirectAddress_Unknown,[email or web address]

## 2017-10-13 NOTE — DISCHARGE NOTE ADULT - MEDICATION SUMMARY - MEDICATIONS TO TAKE
I will START or STAY ON the medications listed below when I get home from the hospital:    exemestane 25 mg oral tablet  -- 1 tab(s) by mouth once a day  -- Indication: For breast ca    cefuroxime 500 mg oral tablet  -- 1 tab(s) by mouth 2 times a day   -- Finish all this medication unless otherwise directed by prescriber.  Medication should be taken with plenty of water.  Take with food or milk.    -- Indication: For pyelonephritis    pantoprazole 40 mg oral delayed release tablet  -- 1 tab(s) by mouth once a day (before a meal)  -- Indication: For GI bleed

## 2017-10-13 NOTE — DISCHARGE NOTE ADULT - PROVIDER TOKENS
FREE:[LAST:[Tani],PHONE:[(   )    -],FAX:[(   )    -]],TOKEN:'[id number]',TOKEN:'[id number]' TOKEN:'[id number]:'[id number]',FREE:[LAST:[Tani],PHONE:[(   )    -],FAX:[(   )    -]],TOKEN:'[id number]'

## 2017-10-13 NOTE — DISCHARGE NOTE ADULT - PATIENT PORTAL LINK FT
“You can access the [facility] Patient Portal, offered by [facility], by registering with the following website: [email or web address]”

## 2017-10-13 NOTE — DISCHARGE NOTE ADULT - HOSPITAL COURSE
76 yo female admitted with severe sepsis secondary to UTI with e. coli and obstructive kidney stones. Pt was admitted to the ICU on IV abx and IV pressors. Sepsis now resolved. She had a left PCN placed by IR. She was seen by urology and will need to follow up as outpatient for PCN removal. She will complete 10 more days of abx. During hospitalization she also had episode of hematemasis. Hgb stable. Seen by GI Dr. Ibarra. Will need to follow up as outpatient for possible egd. 76 yo female admitted with severe sepsis secondary to UTI with e. coli and obstructive kidney stones. Pt was admitted to the ICU on IV abx and IV pressors. Sepsis now resolved. She had a left PCN placed by IR. She was seen by urology and will need to follow up as outpatient for PCN removal. She will complete 10 more days of abx. During hospitalization she also had episode of hematemasis. Hgb stable. Seen by GI Dr. Ibarra. Will need to follow up as outpatient for possible egd.    discussed follow up plan with pt's daughter Cristiane and PCP Dr. Freire 78 yo female admitted with severe sepsis secondary to UTI with e. coli and obstructive kidney stones. Pt was admitted to the ICU on IV abx and IV pressors. Sepsis now resolved. She had a left PCN placed by IR. She was seen by urology and will need to follow up as outpatient for PCN removal. She will complete 10 more days of abx. During hospitalization she also had episode of hematemasis. Hgb stable. Seen by GI Dr. Ibarra. Will need to follow up as outpatient for possible egd. WIll need to f/u with onc for biopsy of nodules.    discussed follow up plan with pt's daughter Cristiane and PCP Dr. Freire

## 2017-10-13 NOTE — PROGRESS NOTE ADULT - PROVIDER SPECIALTY LIST ADULT
Critical Care
Gastroenterology
Gastroenterology
Hospitalist
Hospitalist
Infectious Disease
Infectious Disease
Urology
Urology
Infectious Disease
Infectious Disease

## 2017-10-13 NOTE — PROGRESS NOTE ADULT - SUBJECTIVE AND OBJECTIVE BOX
HOSPITALIST PROGRESS NOTE:  SUBJECTIVE:  PCP: PCP: Tani [phone]  Heme/Onc: Guerda    Chief Complaint: Patient is a 77y old  Female who presents with a chief complaint of SAH (08 Oct 2017 22:22)    HPI:  77 y.o. 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 Pt reports coffee ground emesis, nausea/vomiting. Pt states it started this AM and had worsening left flank pain, 10/10 in severity. Denies subjective fever, but has chills. +n/v. Denies CP, SOB, abd pain, dysuria or diarrhea. Denies LH, dizziness, or HA. Currently, pt is painfree.Per daughter, pt vomited 3 times with blood. The vomitus appears "black". Per daughter, pt had urinary symptoms for the past week. In ED, pt received 2L NS, vanco, ceftriaxone.    Patient was admittied to ICU and placed on pressors.  She was also got a Left PCN by Dr Dodge     10/12: Feel better. Daughter at bedside. PCN draining.   10/13: Pt seen and examined. No complaints. PCN draining well. Afebrile.     Allergies:  No Known Allergies    REVIEW OF SYSTEMS:  See HPI. All other review of systems is negative unless indicated above.     OBJECTIVE  Physical Exam:  Vital Signs Last 24 Hrs  T(C): 37 (12 Oct 2017 20:51), Max: 37.1 (12 Oct 2017 16:59)  T(F): 98.6 (12 Oct 2017 20:51), Max: 98.8 (12 Oct 2017 16:59)  HR: 66 (12 Oct 2017 20:51) (66 - 68)  BP: 121/64 (12 Oct 2017 20:51) (121/64 - 140/55)  BP(mean): --  RR: 17 (12 Oct 2017 20:51) (17 - 18)  SpO2: 95% (12 Oct 2017 20:51) (93% - 95%)    Constitutional: NAD, awake and alert, well-developed  Neurological: AAO x 3, no focal deficits  HEENT: PERRLA, EOMI, MMM  Neck: Soft and supple, No LAD, No JVD  Respiratory: Breath sounds are clear bilaterally, No wheezing, rales or rhonchi  Cardiovascular: S1 and S2, regular rate and rhythm; no Murmurs, gallops or rubs  Gastrointestinal: Bowel Sounds present, soft, nontender, nondistended, no guarding, no rebound tenderness  Back: No CVA tenderness; +Left PCN  Extremities: No peripheral edema  Vascular: 2+ peripheral pulses  Musculoskeletal: 5/5 strength b/l upper and lower extremities   Skin: No rashes  Breast: Deferred  Rectal: Deferred    Labs                              10.4   8.7   )-----------( 138      ( 13 Oct 2017 07:44 )             32.1     13 Oct 2017 07:44    144    |  107    |  7      ----------------------------<  76     3.4     |  30     |  0.50     Ca    8.1        13 Oct 2017 07:44        CAPILLARY BLOOD GLUCOSE            MICROBIOLOGY/CULTURES:  Culture Results:   No growth to date. (10-11 @ 05:10)  Culture Results:   No growth at 48 hours (10-09 @ 14:30)  Culture Results:   >100,000 CFU/ml Escherichia coli (10-08 @ 17:05)  Culture Results:   Growth in aerobic bottle: Escherichia coli  See previous culture [id number] (10-08 @ 16:25)  Culture Results:   Growth in aerobic bottle: Escherichia coli  ***Blood Panel PCR results on this specimen are available  approximately 3 hours after the Gram stain result.***  Gram stain, PCR, and/or culture results may not always  correspond due to difference in methodologies.  ************************************************************  This PCR assay was performed using [employer].  The following targets are tested for: Enterococcus,  vancomycin resistant enterococci, Listeria monocytogenes,  coagulase negative staphylococci, S. aureus,  methicillin resistant S. aureus, Streptococcus agalactiae  (Group B), S. pneumoniae, S. pyogenes (Group A),  Acinetobacter baumannii, Enterobacter cloacae, E. coli,  Klebsiella oxytoca, K. pneumoniae, Proteus sp.,  Serratia marcescens, Haemophilus influenzae,  Neisseria meningitidis, Pseudomonas aeruginosa, Candida  albicans, C. glabrata, C krusei, C parapsilosis,  C. tropicalis and the KPC resistance gene. (10-08 @ 16:25)    RADIOLOGY/EKG:    < from: Xray Chest 1 View AP/PA. (10.08.17 @ 22:27) >    Impression:  1. No evidence of acute pulmonary disease.       < end of copied text >    < from: CT Abdomen and Pelvis No Cont (10.09.17 @ 10:41) >    IMPRESSION:     Improving mild left hydroureteronephrosis without obstructing calculus   identified. Stable punctate left lower pole calculus. CT urogram with and   without contrast may provide additional information.    < end of copied text >    < from: CT Renal Stone Hunt (10.08.17 @ 14:01) >      IMPRESSION:   moderate LEFT hydronephrosis and mild LEFT hydroureter. An   obstructing calculus is not identified.  No perinephric infiltration is   seen. There is a 3 mm calculus at the lower pole of the LEFT kidney.            < end of copied text >    MEDICATIONS  (STANDING):  cefTRIAXone   IVPB      cefTRIAXone   IVPB 1 Gram(s) IV Intermittent every 24 hours  exemestane 25 milliGRAM(s) Oral after breakfast  influenza   Vaccine 0.5 milliLiter(s) IntraMuscular once  pantoprazole    Tablet 40 milliGRAM(s) Oral before breakfast    MEDICATIONS  (PRN):  aluminum hydroxide/magnesium hydroxide/simethicone Suspension 30 milliLiter(s) Oral every 4 hours PRN Dyspepsia  ondansetron Injectable 4 milliGRAM(s) IV Push every 6 hours PRN Nausea  oxyCODONE    IR 5 milliGRAM(s) Oral every 6 hours PRN Moderate Pain (4 - 6)  senna 2 Tablet(s) Oral at bedtime PRN Constipation

## 2017-10-13 NOTE — DISCHARGE NOTE ADULT - CARE PLAN
Principal Discharge DX:	E coli bacteremia  Goal:	treat infection  Instructions for follow-up, activity and diet:	s/p 5 days of ceftriaxone, complete 10 more days of ceftin 2x a day. f/u with Dr. Carver urology for nephrostomy tube removal and to further manage kidney stones next week  Secondary Diagnosis:	UGI bleed  Instructions for follow-up, activity and diet:	continue protonix, f/u with GI Dr. Ibarra for possible egd soon

## 2017-10-13 NOTE — PROGRESS NOTE ADULT - ASSESSMENT
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 Pt reports coffee ground emesis, nausea/vomiting. She had worsening left flank pain, 10/10 in severity. Denies subjective fever, but has chills. +n/v. Per daughter, pt vomited 3 times with blood. The vomitus appears "black". Per daughter, pt had urinary symptoms for the past week. In ER< she was noted febrile to 105F. She received vancomycin IV and ceftriaxone.     1. Febrile syndrome resolved. Sepsis with E.coli resolved. UTI with E.coli. Left kidney stone.  -leukocytosis is resolving  -repeat BC x 2 are negative to date  -on ceftriaxone 1 gm IV QD # 5  -tolerating abx well so far; no side effects noted  -may change abx to ceftin 250 mg PO q12h for 10 more days  -f/u with urology RE nephrostomy  -monitor temps  -f/u CBC  -supportive care  2. Other issues:   -care per medicine

## 2017-10-13 NOTE — DISCHARGE NOTE ADULT - CARE PROVIDER_API CALL
Tani,   Phone: (   )    -  Fax: (   )    -    Pete Carver (MD), Urology  [address]  [address] [place] [address]  Phone: [phone]  Fax: [phone]    Onel Ibarra), Gastroenterology  [address]  [place] [address]  Phone: [phone]  Fax: [phone] Pete Carver), Urology  [address]  [address] [place] [address]  Phone: [phone]  Fax: [phone]    Onel Ibarra), Gastroenterology  [address] [place] [address]  Phone: [phone]  Fax: [phone]    Tani,   Phone: (   )    -  Fax: (   )    -    Yamil Croft), Medical Oncology  [address] [place] [address]  Phone: [phone]  Fax: [phone]

## 2017-10-16 LAB
CULTURE RESULTS: SIGNIFICANT CHANGE UP
SPECIMEN SOURCE: SIGNIFICANT CHANGE UP

## 2017-10-17 ENCOUNTER — APPOINTMENT (OUTPATIENT)
Dept: UROLOGY | Facility: CLINIC | Age: 77
End: 2017-10-17
Payer: MEDICARE

## 2017-10-17 VITALS
DIASTOLIC BLOOD PRESSURE: 81 MMHG | HEART RATE: 65 BPM | OXYGEN SATURATION: 97 % | BODY MASS INDEX: 19.15 KG/M2 | HEIGHT: 59 IN | RESPIRATION RATE: 16 BRPM | TEMPERATURE: 97.9 F | SYSTOLIC BLOOD PRESSURE: 118 MMHG | WEIGHT: 95 LBS

## 2017-10-17 DIAGNOSIS — E87.6 HYPOKALEMIA: ICD-10-CM

## 2017-10-17 DIAGNOSIS — K92.2 GASTROINTESTINAL HEMORRHAGE, UNSPECIFIED: ICD-10-CM

## 2017-10-17 DIAGNOSIS — R65.21 SEVERE SEPSIS WITH SEPTIC SHOCK: ICD-10-CM

## 2017-10-17 DIAGNOSIS — Z85.3 PERSONAL HISTORY OF MALIGNANT NEOPLASM OF BREAST: ICD-10-CM

## 2017-10-17 DIAGNOSIS — N20.1 CALCULUS OF URETER: ICD-10-CM

## 2017-10-17 DIAGNOSIS — N13.6 PYONEPHROSIS: ICD-10-CM

## 2017-10-17 DIAGNOSIS — A41.9 SEPSIS, UNSPECIFIED ORGANISM: ICD-10-CM

## 2017-10-17 DIAGNOSIS — E87.0 HYPEROSMOLALITY AND HYPERNATREMIA: ICD-10-CM

## 2017-10-17 DIAGNOSIS — A41.51 SEPSIS DUE TO ESCHERICHIA COLI [E. COLI]: ICD-10-CM

## 2017-10-17 PROCEDURE — 99204 OFFICE O/P NEW MOD 45 MIN: CPT

## 2017-10-19 PROBLEM — N20.1 CALCIUM URETEROLITHIASIS: Status: ACTIVE | Noted: 2017-10-19

## 2017-10-26 ENCOUNTER — OUTPATIENT (OUTPATIENT)
Dept: OUTPATIENT SERVICES | Facility: HOSPITAL | Age: 77
LOS: 1 days | Discharge: HOME | End: 2017-10-26
Payer: MEDICARE

## 2017-10-26 DIAGNOSIS — N30.00 ACUTE CYSTITIS WITHOUT HEMATURIA: ICD-10-CM

## 2017-10-26 DIAGNOSIS — Z90.10 ACQUIRED ABSENCE OF UNSPECIFIED BREAST AND NIPPLE: Chronic | ICD-10-CM

## 2017-10-26 PROCEDURE — 50389 REMOVE RENAL TUBE W/FLUORO: CPT | Mod: LT

## 2017-10-26 NOTE — BRIEF OPERATIVE NOTE - PROCEDURE
<<-----Click on this checkbox to enter Procedure Nephrostogram via existing catheter  10/26/2017  and tube removal  Active  DAXELROD

## 2018-01-23 ENCOUNTER — RX RENEWAL (OUTPATIENT)
Age: 78
End: 2018-01-23

## 2018-03-11 ENCOUNTER — FORM ENCOUNTER (OUTPATIENT)
Age: 78
End: 2018-03-11

## 2018-03-12 ENCOUNTER — APPOINTMENT (OUTPATIENT)
Dept: CT IMAGING | Facility: CLINIC | Age: 78
End: 2018-03-12
Payer: MEDICARE

## 2018-03-12 ENCOUNTER — OUTPATIENT (OUTPATIENT)
Dept: OUTPATIENT SERVICES | Facility: HOSPITAL | Age: 78
LOS: 1 days | End: 2018-03-12
Payer: MEDICARE

## 2018-03-12 ENCOUNTER — INPATIENT (INPATIENT)
Facility: HOSPITAL | Age: 78
LOS: 2 days | Discharge: HOSPICE | End: 2018-03-15
Attending: HOSPITALIST | Admitting: HOSPITALIST
Payer: MEDICARE

## 2018-03-12 VITALS — HEIGHT: 58 IN | WEIGHT: 65.04 LBS

## 2018-03-12 DIAGNOSIS — Z00.8 ENCOUNTER FOR OTHER GENERAL EXAMINATION: ICD-10-CM

## 2018-03-12 DIAGNOSIS — Z90.10 ACQUIRED ABSENCE OF UNSPECIFIED BREAST AND NIPPLE: Chronic | ICD-10-CM

## 2018-03-12 DIAGNOSIS — R19.7 NAUSEA WITH VOMITING, UNSPECIFIED: ICD-10-CM

## 2018-03-12 DIAGNOSIS — R11.2 NAUSEA WITH VOMITING, UNSPECIFIED: ICD-10-CM

## 2018-03-12 LAB
ALBUMIN SERPL ELPH-MCNC: 3 G/DL — LOW (ref 3.3–5)
ALP SERPL-CCNC: 62 U/L — SIGNIFICANT CHANGE UP (ref 40–120)
ALT FLD-CCNC: 8 U/L — LOW (ref 12–78)
ANION GAP SERPL CALC-SCNC: 12 MMOL/L — SIGNIFICANT CHANGE UP (ref 5–17)
APPEARANCE UR: CLEAR — SIGNIFICANT CHANGE UP
APTT BLD: 23.5 SEC — LOW (ref 27.5–37.4)
AST SERPL-CCNC: 13 U/L — LOW (ref 15–37)
BASOPHILS # BLD AUTO: 0.03 K/UL — SIGNIFICANT CHANGE UP (ref 0–0.2)
BASOPHILS NFR BLD AUTO: 0.3 % — SIGNIFICANT CHANGE UP (ref 0–2)
BILIRUB SERPL-MCNC: 0.5 MG/DL — SIGNIFICANT CHANGE UP (ref 0.2–1.2)
BILIRUB UR-MCNC: NEGATIVE — SIGNIFICANT CHANGE UP
BUN SERPL-MCNC: 17 MG/DL — SIGNIFICANT CHANGE UP (ref 7–23)
CALCIUM SERPL-MCNC: 8.8 MG/DL — SIGNIFICANT CHANGE UP (ref 8.5–10.1)
CHLORIDE SERPL-SCNC: 98 MMOL/L — SIGNIFICANT CHANGE UP (ref 96–108)
CO2 SERPL-SCNC: 24 MMOL/L — SIGNIFICANT CHANGE UP (ref 22–31)
COLOR SPEC: YELLOW — SIGNIFICANT CHANGE UP
CREAT SERPL-MCNC: 0.9 MG/DL — SIGNIFICANT CHANGE UP (ref 0.5–1.3)
DIFF PNL FLD: NEGATIVE — SIGNIFICANT CHANGE UP
EOSINOPHIL # BLD AUTO: 0.05 K/UL — SIGNIFICANT CHANGE UP (ref 0–0.5)
EOSINOPHIL NFR BLD AUTO: 0.5 % — SIGNIFICANT CHANGE UP (ref 0–6)
GLUCOSE SERPL-MCNC: 91 MG/DL — SIGNIFICANT CHANGE UP (ref 70–99)
GLUCOSE UR QL: NEGATIVE MG/DL — SIGNIFICANT CHANGE UP
HCT VFR BLD CALC: 28 % — LOW (ref 34.5–45)
HGB BLD-MCNC: 8.4 G/DL — LOW (ref 11.5–15.5)
IMM GRANULOCYTES NFR BLD AUTO: 0.5 % — SIGNIFICANT CHANGE UP (ref 0–1.5)
INR BLD: 1.01 RATIO — SIGNIFICANT CHANGE UP (ref 0.88–1.16)
KETONES UR-MCNC: (no result)
LEUKOCYTE ESTERASE UR-ACNC: NEGATIVE — SIGNIFICANT CHANGE UP
LYMPHOCYTES # BLD AUTO: 1.63 K/UL — SIGNIFICANT CHANGE UP (ref 1–3.3)
LYMPHOCYTES # BLD AUTO: 16.3 % — SIGNIFICANT CHANGE UP (ref 13–44)
MCHC RBC-ENTMCNC: 25.1 PG — LOW (ref 27–34)
MCHC RBC-ENTMCNC: 30 GM/DL — LOW (ref 32–36)
MCV RBC AUTO: 83.8 FL — SIGNIFICANT CHANGE UP (ref 80–100)
MONOCYTES # BLD AUTO: 1.11 K/UL — HIGH (ref 0–0.9)
MONOCYTES NFR BLD AUTO: 11.1 % — SIGNIFICANT CHANGE UP (ref 2–14)
NEUTROPHILS # BLD AUTO: 7.13 K/UL — SIGNIFICANT CHANGE UP (ref 1.8–7.4)
NEUTROPHILS NFR BLD AUTO: 71.3 % — SIGNIFICANT CHANGE UP (ref 43–77)
NITRITE UR-MCNC: NEGATIVE — SIGNIFICANT CHANGE UP
NRBC # BLD: 0 /100 WBCS — SIGNIFICANT CHANGE UP (ref 0–0)
PH UR: 6.5 — SIGNIFICANT CHANGE UP (ref 5–8)
PLATELET # BLD AUTO: 396 K/UL — SIGNIFICANT CHANGE UP (ref 150–400)
POTASSIUM SERPL-MCNC: 3.8 MMOL/L — SIGNIFICANT CHANGE UP (ref 3.5–5.3)
POTASSIUM SERPL-SCNC: 3.8 MMOL/L — SIGNIFICANT CHANGE UP (ref 3.5–5.3)
PROT SERPL-MCNC: 6.2 GM/DL — SIGNIFICANT CHANGE UP (ref 6–8.3)
PROT UR-MCNC: NEGATIVE MG/DL — SIGNIFICANT CHANGE UP
PROTHROM AB SERPL-ACNC: 10.9 SEC — SIGNIFICANT CHANGE UP (ref 9.8–12.7)
RBC # BLD: 3.34 M/UL — LOW (ref 3.8–5.2)
RBC # FLD: 18.3 % — HIGH (ref 10.3–14.5)
SODIUM SERPL-SCNC: 134 MMOL/L — LOW (ref 135–145)
SP GR SPEC: 1.01 — SIGNIFICANT CHANGE UP (ref 1.01–1.02)
UROBILINOGEN FLD QL: NEGATIVE MG/DL — SIGNIFICANT CHANGE UP
WBC # BLD: 10 K/UL — SIGNIFICANT CHANGE UP (ref 3.8–10.5)
WBC # FLD AUTO: 10 K/UL — SIGNIFICANT CHANGE UP (ref 3.8–10.5)

## 2018-03-12 PROCEDURE — 82565 ASSAY OF CREATININE: CPT

## 2018-03-12 PROCEDURE — 93010 ELECTROCARDIOGRAM REPORT: CPT

## 2018-03-12 PROCEDURE — 74177 CT ABD & PELVIS W/CONTRAST: CPT | Mod: 26

## 2018-03-12 PROCEDURE — 74177 CT ABD & PELVIS W/CONTRAST: CPT

## 2018-03-12 PROCEDURE — 71045 X-RAY EXAM CHEST 1 VIEW: CPT | Mod: 26

## 2018-03-12 RX ORDER — SODIUM CHLORIDE 9 MG/ML
3 INJECTION INTRAMUSCULAR; INTRAVENOUS; SUBCUTANEOUS ONCE
Qty: 0 | Refills: 0 | Status: COMPLETED | OUTPATIENT
Start: 2018-03-12 | End: 2018-03-12

## 2018-03-12 RX ADMIN — SODIUM CHLORIDE 3 MILLILITER(S): 9 INJECTION INTRAMUSCULAR; INTRAVENOUS; SUBCUTANEOUS at 23:26

## 2018-03-12 NOTE — ED STATDOCS - PROGRESS NOTE DETAILS
Kimberly Reyes Ryley: 78 y-o Female with PMHX of [place] Breast CA presents to the ED for ABD pain with nausea x 4days. Pt went to outpatient CT scan today, Dx with cancer. Sent by Dr. Polk for admission. Pt will be sent to main for further evaluation. Kimberly Hedrick: Spoke with Doctor Plok

## 2018-03-12 NOTE — ED STATDOCS - OBJECTIVE STATEMENT
78 y-o Female with PMHX of [place] Breast CA presents to the ED for ABD pain with nausea x 4days. Pt went to outpatient CT scan today, Dx with cancer. Sent by Dr. Polk for admission. Pt will be sent to main for further evaluation.

## 2018-03-13 ENCOUNTER — RESULT REVIEW (OUTPATIENT)
Age: 78
End: 2018-03-13

## 2018-03-13 LAB
AMYLASE FLD-CCNC: 23 U/L — SIGNIFICANT CHANGE UP
B PERT IGG+IGM PNL SER: (no result)
BLD GP AB SCN SERPL QL: SIGNIFICANT CHANGE UP
COLOR FLD: YELLOW — SIGNIFICANT CHANGE UP
ERYTHROCYTE [SEDIMENTATION RATE] IN BLOOD: 14 MM/HR — SIGNIFICANT CHANGE UP (ref 0–20)
FLUID INTAKE SUBSTANCE CLASS: SIGNIFICANT CHANGE UP
FLUID SEGMENTED GRANULOCYTES: 30 % — SIGNIFICANT CHANGE UP
GLUCOSE FLD-MCNC: 76 MG/DL — SIGNIFICANT CHANGE UP
GRAM STN FLD: SIGNIFICANT CHANGE UP
HCT VFR BLD CALC: 22.9 % — LOW (ref 34.5–45)
HCT VFR BLD CALC: 23.9 % — LOW (ref 34.5–45)
HGB BLD-MCNC: 7 G/DL — CRITICAL LOW (ref 11.5–15.5)
HGB BLD-MCNC: 7.1 G/DL — LOW (ref 11.5–15.5)
LDH SERPL L TO P-CCNC: 176 U/L — SIGNIFICANT CHANGE UP
LYMPHOCYTES # FLD: 6 % — SIGNIFICANT CHANGE UP
MCHC RBC-ENTMCNC: 25.4 PG — LOW (ref 27–34)
MCHC RBC-ENTMCNC: 31 GM/DL — LOW (ref 32–36)
MCV RBC AUTO: 81.8 FL — SIGNIFICANT CHANGE UP (ref 80–100)
MONOS+MACROS # FLD: 64 % — SIGNIFICANT CHANGE UP
NIGHT BLUE STAIN TISS: SIGNIFICANT CHANGE UP
NRBC # BLD: 0 /100 WBCS — SIGNIFICANT CHANGE UP (ref 0–0)
PLATELET # BLD AUTO: 308 K/UL — SIGNIFICANT CHANGE UP (ref 150–400)
PROT FLD-MCNC: 3.3 G/DL — SIGNIFICANT CHANGE UP
RAPID RVP RESULT: SIGNIFICANT CHANGE UP
RBC # BLD: 2.8 M/UL — LOW (ref 3.8–5.2)
RBC # FLD: 18.4 % — HIGH (ref 10.3–14.5)
RCV VOL RI: 1000 /UL — HIGH (ref 0–5)
SPECIMEN SOURCE FLD: SIGNIFICANT CHANGE UP
SPECIMEN SOURCE: SIGNIFICANT CHANGE UP
SPECIMEN SOURCE: SIGNIFICANT CHANGE UP
TOTAL NUCLEATED CELL COUNT, BODY FLUID: 643 /UL — HIGH (ref 0–5)
TUBE TYPE: SIGNIFICANT CHANGE UP
TYPE + AB SCN PNL BLD: SIGNIFICANT CHANGE UP
WBC # BLD: 5.24 K/UL — SIGNIFICANT CHANGE UP (ref 3.8–10.5)
WBC # FLD AUTO: 5.24 K/UL — SIGNIFICANT CHANGE UP (ref 3.8–10.5)

## 2018-03-13 PROCEDURE — 93010 ELECTROCARDIOGRAM REPORT: CPT

## 2018-03-13 PROCEDURE — 88108 CYTOPATH CONCENTRATE TECH: CPT | Mod: 26

## 2018-03-13 PROCEDURE — 99285 EMERGENCY DEPT VISIT HI MDM: CPT

## 2018-03-13 PROCEDURE — 88342 IMHCHEM/IMCYTCHM 1ST ANTB: CPT | Mod: 26

## 2018-03-13 PROCEDURE — 93970 EXTREMITY STUDY: CPT | Mod: 26

## 2018-03-13 PROCEDURE — 88305 TISSUE EXAM BY PATHOLOGIST: CPT | Mod: 26

## 2018-03-13 PROCEDURE — 49083 ABD PARACENTESIS W/IMAGING: CPT

## 2018-03-13 RX ORDER — SODIUM CHLORIDE 9 MG/ML
500 INJECTION INTRAMUSCULAR; INTRAVENOUS; SUBCUTANEOUS ONCE
Qty: 0 | Refills: 0 | Status: COMPLETED | OUTPATIENT
Start: 2018-03-13 | End: 2018-03-13

## 2018-03-13 RX ORDER — METRONIDAZOLE 500 MG
500 TABLET ORAL EVERY 8 HOURS
Qty: 0 | Refills: 0 | Status: DISCONTINUED | OUTPATIENT
Start: 2018-03-13 | End: 2018-03-15

## 2018-03-13 RX ORDER — OXYCODONE AND ACETAMINOPHEN 5; 325 MG/1; MG/1
1 TABLET ORAL EVERY 4 HOURS
Qty: 0 | Refills: 0 | Status: DISCONTINUED | OUTPATIENT
Start: 2018-03-13 | End: 2018-03-15

## 2018-03-13 RX ORDER — OXYCODONE AND ACETAMINOPHEN 5; 325 MG/1; MG/1
1 TABLET ORAL ONCE
Qty: 0 | Refills: 0 | Status: DISCONTINUED | OUTPATIENT
Start: 2018-03-13 | End: 2018-03-13

## 2018-03-13 RX ORDER — METRONIDAZOLE 500 MG
500 TABLET ORAL ONCE
Qty: 0 | Refills: 0 | Status: COMPLETED | OUTPATIENT
Start: 2018-03-13 | End: 2018-03-13

## 2018-03-13 RX ORDER — ACETAMINOPHEN 500 MG
650 TABLET ORAL EVERY 6 HOURS
Qty: 0 | Refills: 0 | Status: DISCONTINUED | OUTPATIENT
Start: 2018-03-13 | End: 2018-03-15

## 2018-03-13 RX ORDER — HEPARIN SODIUM 5000 [USP'U]/ML
5000 INJECTION INTRAVENOUS; SUBCUTANEOUS EVERY 8 HOURS
Qty: 0 | Refills: 0 | Status: DISCONTINUED | OUTPATIENT
Start: 2018-03-13 | End: 2018-03-13

## 2018-03-13 RX ORDER — INFLUENZA VIRUS VACCINE 15; 15; 15; 15 UG/.5ML; UG/.5ML; UG/.5ML; UG/.5ML
0.5 SUSPENSION INTRAMUSCULAR ONCE
Qty: 0 | Refills: 0 | Status: DISCONTINUED | OUTPATIENT
Start: 2018-03-13 | End: 2018-03-15

## 2018-03-13 RX ORDER — PANTOPRAZOLE SODIUM 20 MG/1
40 TABLET, DELAYED RELEASE ORAL
Qty: 0 | Refills: 0 | Status: DISCONTINUED | OUTPATIENT
Start: 2018-03-13 | End: 2018-03-15

## 2018-03-13 RX ORDER — METRONIDAZOLE 500 MG
TABLET ORAL
Qty: 0 | Refills: 0 | Status: DISCONTINUED | OUTPATIENT
Start: 2018-03-13 | End: 2018-03-15

## 2018-03-13 RX ORDER — SODIUM CHLORIDE 9 MG/ML
1000 INJECTION INTRAMUSCULAR; INTRAVENOUS; SUBCUTANEOUS
Qty: 0 | Refills: 0 | Status: DISCONTINUED | OUTPATIENT
Start: 2018-03-13 | End: 2018-03-15

## 2018-03-13 RX ORDER — CIPROFLOXACIN LACTATE 400MG/40ML
400 VIAL (ML) INTRAVENOUS EVERY 12 HOURS
Qty: 0 | Refills: 0 | Status: DISCONTINUED | OUTPATIENT
Start: 2018-03-13 | End: 2018-03-15

## 2018-03-13 RX ADMIN — SODIUM CHLORIDE 75 MILLILITER(S): 9 INJECTION INTRAMUSCULAR; INTRAVENOUS; SUBCUTANEOUS at 10:30

## 2018-03-13 RX ADMIN — Medication 100 MILLIGRAM(S): at 14:24

## 2018-03-13 RX ADMIN — PANTOPRAZOLE SODIUM 40 MILLIGRAM(S): 20 TABLET, DELAYED RELEASE ORAL at 14:24

## 2018-03-13 RX ADMIN — Medication 100 MILLIGRAM(S): at 10:30

## 2018-03-13 RX ADMIN — Medication 200 MILLIGRAM(S): at 17:24

## 2018-03-13 RX ADMIN — Medication 100 MILLIGRAM(S): at 21:07

## 2018-03-13 RX ADMIN — SODIUM CHLORIDE 500 MILLILITER(S): 9 INJECTION INTRAMUSCULAR; INTRAVENOUS; SUBCUTANEOUS at 02:43

## 2018-03-13 NOTE — H&P ADULT - HISTORY OF PRESENT ILLNESS
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 Pt presented to Dr Alexander Marsh office yesterday after she was having abovementioned symptoms and was sent to [facility] radiology [place] for abd CT and was found to have extensive omental implants, peritoneal carcinomatosis, enlarging ascites and acute ileitis. CT findings were also concerning for superficial femoral thrombosis however DVT could not be ruled out.         social: social ETOH, no tobacco or drugs  ShX: mastectomy, LN dissection, L nephrostomy tube, pyloric stenosis correction/sx at age 19  Fhx: NC to this admission 76 yo female with pmh stage III breast CA with mets, enlarged posterior neck LN, s/p mastectomy, ER(+) non compliant with hormone therapy or follow up visits with Dr Reyes, Brain ACOM/aneurysm/SDH s/p coiling 3/16, L renal calculi s/p nephrostomy tube p/w N/V. Pt presented to Dr Alexander Marsh office yesterday after she was having abovementioned symptoms and was sent to [facility] radiology center for abd CT and was found to have extensive omental implants, peritoneal carcinomatosis, enlarging ascites and acute ileitis. CT findings were also concerning for superficial femoral thrombosis however DVT could not be ruled out.   Denies fevers. Had some abd pain, noticed increasing abd girth. Denies SOB. No melena or hematochezia.         social: social ETOH, no tobacco or drugs  ShX: mastectomy, LN dissection, L nephrostomy tube, pyloric stenosis correction/sx at age 19  Fhx: NC to this admission

## 2018-03-13 NOTE — H&P ADULT - NSHPPHYSICALEXAM_GEN_ALL_CORE
PHYSICAL EXAM:    Constitutional: NAD, awake and alert, well-developed  HEENT: PERR, EOMI, Normal Hearing, MMM  Neck: Soft and supple, No LAD, No JVD  Respiratory: Breath sounds are clear bilaterally, No wheezing, rales or rhonchi  Cardiovascular: S1 and S2, regular rate and rhythm, no Murmurs, gallops or rubs  Gastrointestinal: Bowel Sounds present, soft, nontender, nondistended, no guarding, no rebound  Extremities: No peripheral edema  Vascular: 2+ peripheral pulses  Neurological: A/O x 3, no focal deficits  Musculoskeletal: 5/5 strength b/l upper and lower extremities  Skin: No rashes PHYSICAL EXAM:    Constitutional: NAD, awake and alert, weak/frail, temporal wasting  HEENT: PERR, EOMI, Normal Hearing, MMM  Neck: Soft and supple, No LAD, No JVD  Respiratory: Breath sounds are clear bilaterally, No wheezing, rales or rhonchi  Cardiovascular: S1 and S2, regular rate and rhythm, no Murmurs, gallops or rubs  Gastrointestinal: Bowel Sounds present, soft, nontender, nondistended, distended  Extremities: No peripheral edema  Vascular: 2+ peripheral pulses  Neurological: A/O x 3, no focal deficits  Musculoskeletal: 5/5 strength b/l upper and lower extremities  Skin: No rashes

## 2018-03-13 NOTE — CONSULT NOTE ADULT - SUBJECTIVE AND OBJECTIVE BOX
78-year-old female   May 18, 2012 right breast core biopsy infiltrating mammary carcinoma 2012 underwent right mastectomy had a T2 N2 stage IIIa breast cancer   August 9, 201 2right simple mastectomy infiltrating lobular carcinoma, 4 cm,pT2;   4 of 9 lymph nodes with metastatic carcinoma pN2a  ER80-90%, AR negative, HER-2 negative  Patient had medical oncology consultations at  [facility],  [facility], and with me; everyone suggested systemic chemotherapy and endocrine therapy. Patient refused all treatment including radiation.  August 2014  recurrence along right breast mastectomy scar. Had resection which confirmed original pathology. PET CT scan did not show metastatic disease.  August 2014 began endocrine therapy exemestane.  Patient has been noncompliant with taking this , ultimately developed lesions in the skin over the back and neck; of concern was possible metastatic disease to the skin.    September 2, 2017 PET/CT scan at [facility] :"generally unchanged or slightly decreased conspicuity of several soft tissue nodules in the right mastectomy bed.  slightly increased size and if  DG avidity of subcutaneous nodules in the neck , favored to represent sebaceous cysts with inflammatory changes less likely neoplasm"  Patient was referred to dermatology and subsequently referred to ENT,, no biopsy obtained.  Apparently continued exemestane.    January 2018 developed intermittent nausea or vomiting; utimately went to  urgent care medicine and was told she had the flu.  Patient did not have fevers productive cough and no other family members were ill.  This continued, patient had decreased by mouth intake and lost significant amounts of weight and has been intermittently listless.  Saw gynecologist who ordered CT scan of abdomen/pelvis.  this shows ascites,,possible carcinomatosis, small bowel ileitis, questionable abnormality right superficial  femoral vein.    review of systems intermittent diarrhea, constipation vomiting fatigue, had episode of dark vomitus days ago  No significant pains, denies fevers or rigors    Physical examination cachectic, fatigued 78-year-old female in no acute distress  HEENT: temporal wasting, sunken eyes, oropharynx dry  Neck supple  Lungs without rhonchi or wheezes  Heart S1-S2  abdomen soft, minimal tenderness, no rebound no guarding no bowel sounds  extremities without edema  Right chest wall s/p mmastectomy with nodularity under mastectomy scar  lymph nodes palpable right axilla  Skin  with large nodules, back of neck and small nodules diffuse chest/neck bilateral  neurologic moves all extremities well    Chemistry albumin 3, sodium 134,  AST 13, ALT 8 (all low)   BUN 17, creatinine 0.9  APTT 23.5  :L       PT 10.9, INR 1.0  WBC 10, Hgb 8.4, HCT 28, MCV 83.8, MCH 25.1, RDW 18.3, platelets 396
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 Pt presented to Dr Alexander Marsh office yesterday after she was having abovementioned symptoms and was sent to [facility] radiology [place] for abd CT and was found to have extensive omental implants, peritoneal carcinomatosis, enlarging ascites and acute ileitis. CT findings were also concerning for superficial femoral thrombosis however DVT could not be ruled out.     3/13/2018-  Pt reports feeling better.  Denies any abdominal pain at this time but stated she had some lower abdominal discomfort yesterday which resolved.  Denies fevers, weight loss, n/v, diarrhea or constipation, melena or hematochezia.  Denies SOB or CP.    social: social ETOH, no tobacco or drugs  ShX: mastectomy, LN dissection, L nephrostomy tube, pyloric stenosis correction/sx at age 19  Fhx: NC to this admission (13 Mar 2018 09:08)    PAST MEDICAL & SURGICAL HISTORY:  Metastatic breast cancer  Breast CA  H/O mastectomy    MEDICATIONS  (STANDING):  ciprofloxacin   IVPB 400 milliGRAM(s) IV Intermittent every 12 hours  heparin  Injectable 5000 Unit(s) SubCutaneous every 8 hours  metroNIDAZOLE  IVPB 500 milliGRAM(s) IV Intermittent every 8 hours  metroNIDAZOLE  IVPB      pantoprazole    Tablet 40 milliGRAM(s) Oral before breakfast  sodium chloride 0.9%. 1000 milliLiter(s) (75 mL/Hr) IV Continuous <Continuous>    MEDICATIONS  (PRN):  acetaminophen   Tablet. 650 milliGRAM(s) Oral every 6 hours PRN Mild Pain (1 - 3)  oxyCODONE    5 mG/acetaminophen 325 mG 1 Tablet(s) Oral every 4 hours PRN Moderate Pain (4 - 6)    Allergies    No Known Allergies    REVIEW OF SYSTEMS:  CONSTITUTIONAL: No weakness, fevers or chills  EYES/ENT: No visual changes;  No vertigo or throat pain   NECK: No pain or stiffness  RESPIRATORY: No cough, wheezing, hemoptysis; No shortness of breath  CARDIOVASCULAR: No chest pain or palpitations  GASTROINTESTINAL: As per HPI.  All other review of systems is negative unless indicated above.    Vital Signs Last 24 Hrs  T(C): 36.8 (13 Mar 2018 07:32), Max: 36.9 (13 Mar 2018 02:45)  T(F): 98.2 (13 Mar 2018 07:32), Max: 98.5 (13 Mar 2018 02:45)  HR: 75 (13 Mar 2018 07:32) (75 - 90)  BP: 118/63 (13 Mar 2018 07:32) (118/63 - 137/89)  BP(mean): --  RR: 17 (13 Mar 2018 07:32) (15 - 17)  SpO2: 100% (13 Mar 2018 07:32) (100% - 100%)    PHYSICAL EXAM:  Constitutional: + temporal wasting, appears chronically ill, in NAD  Respiratory: CTAB  Cardiovascular: S1 and S2, RRR, no M/G/R  Gastrointestinal: + tenderness to B/L lower abdomin, ND, +BS    LABS:                        8.4    10.00 )-----------( 396      ( 12 Mar 2018 23:13 )             28.0     03-12    134<L>  |  98  |  17  ----------------------------<  91  3.8   |  24  |  0.90    Ca    8.8      12 Mar 2018 23:13    TPro  6.2  /  Alb  3.0<L>  /  TBili  0.5  /  DBili  x   /  AST  13<L>  /  ALT  8<L>  /  AlkPhos  62  03-12    PT/INR - ( 12 Mar 2018 23:13 )   PT: 10.9 sec;   INR: 1.01 ratio         PTT - ( 12 Mar 2018 23:13 )  PTT:23.5 sec  LIVER FUNCTIONS - ( 12 Mar 2018 23:13 )  Alb: 3.0 g/dL / Pro: 6.2 gm/dL / ALK PHOS: 62 U/L / ALT: 8 U/L / AST: 13 U/L / GGT: x             RADIOLOGY & ADDITIONAL STUDIES:

## 2018-03-13 NOTE — CONSULT NOTE ADULT - CONSULT REASON
history metastatic breast cancer, with vomiting, ileitis,anemia,ascites, questionable carcinomatosis
ileitis

## 2018-03-13 NOTE — ED PROVIDER NOTE - OBJECTIVE STATEMENT
78 y-o Female with PMHX of [place]as Breast CA presents to the ED for ABD pain with nausea x 4days. Pt went to outpatient CT scan today, Dx with new carcinomatosisi, ascites and iliitis. Sent by Dr. Polk for admission

## 2018-03-13 NOTE — H&P ADULT - ASSESSMENT
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 Pt presented to Dr Alexander Marsh office yesterday after she was having abovementioned symptoms and was sent to [facility] radiology [place] for abd CT and was found to have extensive omental implants, peritoneal carcinomatosis, enlarging ascites and acute ileitis. CT findings were also concerning for superficial femoral thrombosis however DVT could not be ruled out.       A/P:    Nausea/Vomiting  Breast CA with mets  Carcinomatosis  Ascites  Omental implants  ?DVT  Acute ilieitis r/o infectious vs inflammatory  Acute on chronic anemia (baseline 9-10)      PLAN:  CT noted and confirms metastatic disease with omental spread, ascites and carcinomatosis  IR for Biopsy of lesion for tumor markers/diagnostic and therapeutic abdominal paracentesis.   CT scan of chest neg for acute disease. Echo - patient may poss be started on adriamycin. Needs baseline echo. Oncology consult. Hold examenstane for now.   Doppler- r/o DVT  Empiric Cipro/Flagyl. Stool studies. GI consult  Iron studies. 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 Pt presented to Dr Alexander Marsh office yesterday after she was having abovementioned symptoms and was sent to [facility] radiology [place] for abd CT and was found to have extensive omental implants, peritoneal carcinomatosis, enlarging ascites and acute ileitis. CT findings were also concerning for superficial femoral thrombosis however DVT could not be ruled out.       A/P:    Nausea/Vomiting  Breast CA with mets  Carcinomatosis  Ascites  Omental implants  ?DVT  Acute ilieitis r/o infectious vs inflammatory  Acute on chronic anemia (baseline 9-10)      PLAN:  CT noted and confirms metastatic disease with omental spread, ascites and carcinomatosis  IR for Biopsy of lesion for tumor markers/diagnostic and therapeutic abdominal paracentesis.   CT scan of chest neg for acute disease. Echo - patient may poss be started on adriamycin. Needs baseline echo. Oncology consult. Hold examenstane for now.   Doppler-> neg for DVT  Empiric Cipro/Flagyl. Stool studies. GI consult  Iron studies. [place] and screen      DVT px/GI Px

## 2018-03-13 NOTE — H&P ADULT - NSHPREVIEWOFSYSTEMS_GEN_ALL_CORE
REVIEW OF SYSTEMS:    CONSTITUTIONAL: No weakness, fevers or chills  EYES/ENT: No visual changes;  No vertigo or throat pain   NECK: No pain or stiffness  RESPIRATORY: No cough, wheezing, hemoptysis; No shortness of breath  CARDIOVASCULAR: No chest pain or palpitations  GASTROINTESTINAL: +N/V  GENITOURINARY: No dysuria, frequency or hematuria  NEUROLOGICAL: No numbness or weakness  SKIN: No itching, burning, rashes, or lesions   All other review of systems is negative unless indicated above

## 2018-03-13 NOTE — CONSULT NOTE ADULT - ASSESSMENT
79 y/o female with ?metastatic breast cancer s/p recent imaging at [facility] for abd CT and was found to have extensive omental implants, peritoneal carcinomatosis, enlarging ascites and acute ileitis.    Impression:  + ileitis on CT scan unlikely due to infectious process, suspect inflammatory in nature due to CT findings and clinical presentation.  Acute on chronic anemia.    Plan:  Advance diet as tolerated.  WBC normal.  Continue empiric ABX for now.  Monitor H/H.  ?IR for biospy    Will review pt's history in office.    Plan discussed with pt, RN, Dr. Maza, and Dr. Downs. 79 y/o female with ?metastatic breast cancer s/p recent imaging at [facility] for abd CT and was found to have extensive omental implants, peritoneal carcinomatosis, enlarging ascites and acute ileitis.    Impression:  + ileitis on CT scan unlikely due to infectious process, suspect inflammatory in nature due to CT findings and clinical presentation.  Acute on chronic anemia.    Plan:  Advance diet as tolerated.  WBC normal.  Continue empiric ABX for now.  Monitor H/H.  Will await iron studies/GI stools.  ?IR for biospy    Will review pt's history in office.    Plan discussed with pt, RN, Dr. Maza, and Dr. Downs. 77 y/o female with ?metastatic breast cancer s/p recent imaging at [facility] for abd CT and was found to have extensive omental implants, peritoneal carcinomatosis, enlarging ascites and acute ileitis.    Impression:  + ileitis on CT scan unlikely due to infectious process, suspect inflammatory in nature due to CT findings and clinical presentation.  Acute on chronic anemia.    Plan:  Advance diet as tolerated.  WBC normal.  Continue empiric ABX for now.  Monitor H/H.  Will await iron studies/GI stools.  ?IR for biopsy    Will review pt's history in office.    Plan discussed with pt, RN, Dr. Maza, and Dr. Downs. 77 y/o female with ?metastatic breast cancer s/p recent imaging at [facility] for abd CT and was found to have extensive omental implants, peritoneal carcinomatosis, enlarging ascites and acute ileitis.    Impression:  + ileitis on CT scan unlikely due to infectious process or IBD, suspect tumor infiltration of SB due to CT findings and clinical presentation.  Acute on chronic anemia.    Plan:  Advance diet as tolerated.  WBC normal.  Continue empiric ABX for now-may consider d/cing in the AM?  Monitor H/H.  Will await iron studies/GI stools.  ?IR for biopsy R/O mets.    Will review pt's history in office.    Plan discussed with pt, RN, Dr. Maza, and Dr. Downs.

## 2018-03-13 NOTE — ED PROVIDER NOTE - CONSTITUTIONAL, MLM
normal... thin, cachectic awake, alert, oriented to person, place, time/situation and in no apparent distress.

## 2018-03-13 NOTE — CONSULT NOTE ADULT - ASSESSMENT
impression   2012 stage III lobular carcinoma ER positive, GA negative, HER-2 negative, right mastectomy and axillary node involved; refused all treatment  2 years later with recurrent metastatic disease;  has intermittently taken endocrine therapy, exemestane  Questionable metastatic disease to skin summer 2017, PET/CT scan nondiagnostic and patient never had biopsy  Now with almost 2 months  intermittent abdominal pain, vomiting, significant weight loss  CT scan with new ascites, questionable carcinomatosis, acute ileitis, anemia    Anemia : probably multifactorial in origin, patient may have had hemoptysis, and probably has been bleeding intermittently. .Will check iron folate and B12  Will need iron replacement as well as folic acid, possibly B-12  In light of above and CT scans suggesting ileitis request GI consultation,  consider tumor infiltration of small bowel as this was  is lobular cancer.  Paracentesis for relief, as well as cytology. Need to consider possible ovarian cancer although metastatic lobular breast cancer is still most likely.    history of brain aneurysm/? dementia?  May consider CT scan or MRI of head to rule out occult metastases.    I have reviewed likelihood of metastatic cancer with the patient  and at this point she is willing  to consider chemotherapy if appropriate .I do not believe that oral endocrine therapy would be sufficient given the extent of disease.  Would require placement of port prior to this.    Palliative care consult to address psychosocial issues, ( does Pt have ? mild dementia), goals of care, pain control.  above reviewed earlier today with patient, her daughter via telephone, and later with he son( Abelardo [phone]  via telephone, and hospitalist. impression   2012 stage III lobular carcinoma ER positive, ND negative, HER-2 negative, right mastectomy and axillary node involved; refused all treatment  2 years later with recurrent metastatic disease;  has intermittently taken endocrine therapy, exemestane  Questionable metastatic disease to skin summer 2017, PET/CT scan nondiagnostic and patient never had biopsy  Now with almost 2 months  intermittent abdominal pain, vomiting, significant weight loss  CT scan with new ascites, questionable carcinomatosis, acute ileitis, anemia    Anemia : probably multifactorial in origin, patient may have had hemoptysis, and probably has been bleeding intermittently. .Will check iron folate and B12  Will need iron replacement as well as folic acid, possibly B-12  In light of above and CT scans suggesting ileitis request GI consultation,  consider tumor infiltration of small bowel as this was  is lobular cancer.  Paracentesis for relief, as well as cytology. Need to consider possible ovarian cancer although metastatic lobular breast cancer is still most likely.    history of brain aneurysm/? dementia?  May consider CT scan or MRI of head to rule out occult metastases.    I have reviewed likelihood of metastatic cancer with the patient  and at this point she is willing  to consider chemotherapy if appropriate .I do not believe that oral endocrine therapy would be sufficient given the extent of disease.  Would require placement of port prior to this.    Duplex Doppler negative for DVT as per Radiology report.    Palliative care consult to address psychosocial issues, ( does Pt have ? mild dementia), goals of care, pain control.  above reviewed earlier today with patient, her daughter via telephone, and later with he son( Abelardo [phone]  via telephone, and hospitalist.

## 2018-03-13 NOTE — H&P ADULT - NSHPLABSRESULTS_GEN_ALL_CORE
LABS: All Labs Reviewed:                        8.4    10.00 )-----------( 396      ( 12 Mar 2018 23:13 )             28.0     03-12    134<L>  |  98  |  17  ----------------------------<  91  3.8   |  24  |  0.90    Ca    8.8      12 Mar 2018 23:13    TPro  6.2  /  Alb  3.0<L>  /  TBili  0.5  /  DBili  x   /  AST  13<L>  /  ALT  8<L>  /  AlkPhos  62  03-12    PT/INR - ( 12 Mar 2018 23:13 )   PT: 10.9 sec;   INR: 1.01 ratio         PTT - ( 12 Mar 2018 23:13 )  PTT:23.5 sec          Blood Culture:             < from: Xray Chest 1 View AP/PA. (03.12.18 @ 22:31) >    Impression:    No acute disease    < end of copied text >    CT chest: NO acute disease   CT abd/pelvis: noted

## 2018-03-14 LAB
ALBUMIN SERPL ELPH-MCNC: 2.3 G/DL — LOW (ref 3.3–5)
ALP SERPL-CCNC: 47 U/L — SIGNIFICANT CHANGE UP (ref 40–120)
ALT FLD-CCNC: 46 U/L — SIGNIFICANT CHANGE UP (ref 12–78)
AMMONIA BLD-MCNC: 37 UMOL/L — HIGH (ref 11–32)
ANION GAP SERPL CALC-SCNC: 8 MMOL/L — SIGNIFICANT CHANGE UP (ref 5–17)
AST SERPL-CCNC: 11 U/L — LOW (ref 15–37)
BASOPHILS # BLD AUTO: 0.02 K/UL — SIGNIFICANT CHANGE UP (ref 0–0.2)
BASOPHILS NFR BLD AUTO: 0.3 % — SIGNIFICANT CHANGE UP (ref 0–2)
BILIRUB SERPL-MCNC: 0.2 MG/DL — SIGNIFICANT CHANGE UP (ref 0.2–1.2)
BUN SERPL-MCNC: 12 MG/DL — SIGNIFICANT CHANGE UP (ref 7–23)
CALCIUM SERPL-MCNC: 7.7 MG/DL — LOW (ref 8.5–10.1)
CANCER AG125 SERPL-ACNC: 108 U/ML — HIGH
CANCER AG27-29 SERPL-ACNC: 19.9 U/ML — SIGNIFICANT CHANGE UP (ref 0–37.7)
CHLORIDE SERPL-SCNC: 107 MMOL/L — SIGNIFICANT CHANGE UP (ref 96–108)
CO2 SERPL-SCNC: 26 MMOL/L — SIGNIFICANT CHANGE UP (ref 22–31)
CREAT SERPL-MCNC: 0.7 MG/DL — SIGNIFICANT CHANGE UP (ref 0.5–1.3)
EOSINOPHIL # BLD AUTO: 0.06 K/UL — SIGNIFICANT CHANGE UP (ref 0–0.5)
EOSINOPHIL NFR BLD AUTO: 1 % — SIGNIFICANT CHANGE UP (ref 0–6)
FERRITIN SERPL-MCNC: 10 NG/ML — LOW (ref 15–150)
FOLATE SERPL-MCNC: 13.9 NG/ML — SIGNIFICANT CHANGE UP (ref 4.8–24.2)
FOLATE SERPL-MCNC: 17.5 NG/ML — SIGNIFICANT CHANGE UP (ref 4.8–24.2)
GLUCOSE SERPL-MCNC: 119 MG/DL — HIGH (ref 70–99)
HAPTOGLOB SERPL-MCNC: 195 MG/DL — SIGNIFICANT CHANGE UP (ref 34–200)
HCT VFR BLD CALC: 23.6 % — LOW (ref 34.5–45)
HGB BLD-MCNC: 7.2 G/DL — LOW (ref 11.5–15.5)
IMM GRANULOCYTES NFR BLD AUTO: 0.3 % — SIGNIFICANT CHANGE UP (ref 0–1.5)
INR BLD: 1.08 RATIO — SIGNIFICANT CHANGE UP (ref 0.88–1.16)
IRON SATN MFR SERPL: 10 UG/DL — LOW (ref 30–160)
IRON SATN MFR SERPL: 3 % — LOW (ref 14–50)
IRON SATN MFR SERPL: 4 % — LOW (ref 14–50)
IRON SATN MFR SERPL: 9 UG/DL — LOW (ref 30–160)
LDH SERPL L TO P-CCNC: 128 U/L — SIGNIFICANT CHANGE UP (ref 84–241)
LYMPHOCYTES # BLD AUTO: 0.74 K/UL — LOW (ref 1–3.3)
LYMPHOCYTES # BLD AUTO: 12.9 % — LOW (ref 13–44)
MCHC RBC-ENTMCNC: 25 PG — LOW (ref 27–34)
MCHC RBC-ENTMCNC: 30.5 GM/DL — LOW (ref 32–36)
MCV RBC AUTO: 81.9 FL — SIGNIFICANT CHANGE UP (ref 80–100)
MONOCYTES # BLD AUTO: 0.68 K/UL — SIGNIFICANT CHANGE UP (ref 0–0.9)
MONOCYTES NFR BLD AUTO: 11.8 % — SIGNIFICANT CHANGE UP (ref 2–14)
NEUTROPHILS # BLD AUTO: 4.23 K/UL — SIGNIFICANT CHANGE UP (ref 1.8–7.4)
NEUTROPHILS NFR BLD AUTO: 73.7 % — SIGNIFICANT CHANGE UP (ref 43–77)
NRBC # BLD: 0 /100 WBCS — SIGNIFICANT CHANGE UP (ref 0–0)
PLATELET # BLD AUTO: 313 K/UL — SIGNIFICANT CHANGE UP (ref 150–400)
POTASSIUM SERPL-MCNC: 3 MMOL/L — LOW (ref 3.5–5.3)
POTASSIUM SERPL-SCNC: 3 MMOL/L — LOW (ref 3.5–5.3)
PREALB SERPL-MCNC: 12 MG/DL — LOW (ref 18–45)
PROT SERPL-MCNC: 4.8 GM/DL — LOW (ref 6–8.3)
PROTHROM AB SERPL-ACNC: 11.7 SEC — SIGNIFICANT CHANGE UP (ref 9.8–12.7)
RBC # BLD: 2.88 M/UL — LOW (ref 3.8–5.2)
RBC # BLD: 2.88 M/UL — LOW (ref 3.8–5.2)
RBC # FLD: 18.5 % — HIGH (ref 10.3–14.5)
RETICS #: 82.1 K/UL — SIGNIFICANT CHANGE UP (ref 25–125)
RETICS/RBC NFR: 2.9 % — HIGH (ref 0.5–2.5)
SODIUM SERPL-SCNC: 141 MMOL/L — SIGNIFICANT CHANGE UP (ref 135–145)
TIBC SERPL-MCNC: 238 UG/DL — SIGNIFICANT CHANGE UP (ref 220–430)
TIBC SERPL-MCNC: 262 UG/DL — SIGNIFICANT CHANGE UP (ref 220–430)
UIBC SERPL-MCNC: 228 UG/DL — SIGNIFICANT CHANGE UP (ref 110–370)
UIBC SERPL-MCNC: 253 UG/DL — SIGNIFICANT CHANGE UP (ref 110–370)
VIT B12 SERPL-MCNC: 392 PG/ML — SIGNIFICANT CHANGE UP (ref 232–1245)
VIT B12 SERPL-MCNC: 396 PG/ML — SIGNIFICANT CHANGE UP (ref 232–1245)
WBC # BLD: 5.75 K/UL — SIGNIFICANT CHANGE UP (ref 3.8–10.5)
WBC # FLD AUTO: 5.75 K/UL — SIGNIFICANT CHANGE UP (ref 3.8–10.5)

## 2018-03-14 RX ORDER — ONDANSETRON 8 MG/1
4 TABLET, FILM COATED ORAL EVERY 6 HOURS
Qty: 0 | Refills: 0 | Status: DISCONTINUED | OUTPATIENT
Start: 2018-03-14 | End: 2018-03-15

## 2018-03-14 RX ORDER — POTASSIUM CHLORIDE 20 MEQ
40 PACKET (EA) ORAL ONCE
Qty: 0 | Refills: 0 | Status: COMPLETED | OUTPATIENT
Start: 2018-03-14 | End: 2018-03-14

## 2018-03-14 RX ORDER — SODIUM FERRIC GLUCONAT/SUCROSE 62.5MG/5ML
125 AMPUL (ML) INTRAVENOUS DAILY
Qty: 0 | Refills: 0 | Status: DISCONTINUED | OUTPATIENT
Start: 2018-03-14 | End: 2018-03-15

## 2018-03-14 RX ORDER — ALPRAZOLAM 0.25 MG
0.25 TABLET ORAL
Qty: 0 | Refills: 0 | Status: DISCONTINUED | OUTPATIENT
Start: 2018-03-14 | End: 2018-03-15

## 2018-03-14 RX ORDER — MORPHINE SULFATE 50 MG/1
2 CAPSULE, EXTENDED RELEASE ORAL EVERY 6 HOURS
Qty: 0 | Refills: 0 | Status: DISCONTINUED | OUTPATIENT
Start: 2018-03-14 | End: 2018-03-15

## 2018-03-14 RX ORDER — ALPRAZOLAM 0.25 MG
0.25 TABLET ORAL ONCE
Qty: 0 | Refills: 0 | Status: DISCONTINUED | OUTPATIENT
Start: 2018-03-14 | End: 2018-03-15

## 2018-03-14 RX ADMIN — Medication 100 MILLIGRAM(S): at 21:30

## 2018-03-14 RX ADMIN — PANTOPRAZOLE SODIUM 40 MILLIGRAM(S): 20 TABLET, DELAYED RELEASE ORAL at 05:06

## 2018-03-14 RX ADMIN — SODIUM CHLORIDE 75 MILLILITER(S): 9 INJECTION INTRAMUSCULAR; INTRAVENOUS; SUBCUTANEOUS at 05:06

## 2018-03-14 RX ADMIN — Medication 100 MILLIGRAM(S): at 05:06

## 2018-03-14 RX ADMIN — Medication 40 MILLIEQUIVALENT(S): at 10:57

## 2018-03-14 RX ADMIN — ONDANSETRON 4 MILLIGRAM(S): 8 TABLET, FILM COATED ORAL at 14:27

## 2018-03-14 RX ADMIN — Medication 100 MILLIGRAM(S): at 13:53

## 2018-03-14 RX ADMIN — Medication 200 MILLIGRAM(S): at 06:18

## 2018-03-14 RX ADMIN — Medication 110 MILLIGRAM(S): at 12:19

## 2018-03-14 RX ADMIN — Medication 200 MILLIGRAM(S): at 17:08

## 2018-03-14 RX ADMIN — MORPHINE SULFATE 2 MILLIGRAM(S): 50 CAPSULE, EXTENDED RELEASE ORAL at 14:27

## 2018-03-14 NOTE — PROGRESS NOTE ADULT - ASSESSMENT
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 Pt presented to Dr Alexander Marsh office yesterday after she was having abovementioned symptoms and was sent to [facility] radiology [place] for abd CT and was found to have extensive omental implants, peritoneal carcinomatosis, enlarging ascites and acute ileitis. CT findings were also concerning for superficial femoral thrombosis however DVT could not be ruled out.       A/P:    Nausea/Vomiting  Breast CA with mets -Advanced progressive disease  Carcinomatosis  Ascites  Omental implants  Acute ilieitis r/o infectious vs inflammatory  Acute on chronic anemia (baseline 9-10)      PLAN:  CT noted and confirms metastatic disease with omental spread, ascites and carcinomatosis-stage IV disease  No role for further therapy as per discussion with oncology d/t progression and her poor performance  s/p abd paracentesis for large ascites  Doppler-> neg for DVT  Empiric Cipro/Flagyl. Stool studies.  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 Pt presented to Dr Alexander Marsh office yesterday after she was having abovementioned symptoms and was sent to [facility] for abd CT and was found to have extensive omental implants, peritoneal carcinomatosis, enlarging ascites and acute ileitis. CT findings were also concerning for superficial femoral thrombosis however DVT could not be ruled out.       A/P:    Nausea/Vomiting  Breast CA with mets -Advanced progressive disease  Carcinomatosis  Ascites  Omental implants  Acute ilieitis r/o infectious vs inflammatory  Acute on chronic anemia (baseline 9-10)  Metabolic encephalopathy      PLAN:  CT noted and confirms metastatic disease with omental spread, ascites and carcinomatosis-stage IV disease  No role for further therapy as per discussion with oncology d/t progression and her poor performance  Given her marked deteroration, anorexia, disease spread, and poor functional status, she stands a grave prognosis and could not withstand any further treatments. Hospice eligible  s/p abd paracentesis for large ascites  Doppler-> neg for DVT  Empiric Cipro/Flagyl. Stool studies.  Palliative consult to begin comfort mngmnt  Morphine 2mg q6H PRN for pain  Xanax 0.25mg po BID for agitation/anxiety  Anti-emetics PRN  No further blood draws and discuss code status with patient today      DVT px/GI Px

## 2018-03-14 NOTE — PROGRESS NOTE ADULT - ASSESSMENT
77 y/o female with stage 3 breast ca, now with nausea/vomiting, and recent CT of abd/pel w/ insignificant findings noted.      Impression:  Unlikely infectious, and doubt IBS-more likely tumor infiltrate of SB, ?mets.    Plan:  Diet as tolerated.  PPIs.   Empiric ABX for now, however unlikely due to infectious process.  ?IR biopsy vs. MRI of the head? - brain mets?   Refused blood transfusion/echo at this time until she speaks with Dr. Samuel.   Palliative involved.   Supportive care.    Discussed with nurse, pt, Dr. Downs, and Dr. Maza. 79 y/o female with stage 3 breast ca, now with nausea/vomiting, and recent CT of abd/pel w/ insignificant findings noted.      Impression:  Unlikely infectious, and doubt IBS-more likely tumor infiltrate of SB, ?mets.    Plan:  Diet as tolerated.  PPIs.   Trending H/H-transfuse as needed.  Empiric ABX for now, however unlikely due to infectious process.  Paracentesis yesterday.    Would consider MRI of the head? - brain mets?   Refused blood transfusion/echo at this time until she speaks with Dr. Samuel.   Palliative involved.   Supportive care.    Discussed with nurse, pt, Dr. Downs, and Dr. Maza. 79 y/o female with stage 3 breast ca, now with nausea/vomiting, and recent CT of abd/pel w/ insignificant findings noted.      Impression:  Unlikely infectious, and doubt IBS-more likely tumor infiltrate of SB, ?mets.  Acute on chronic anemia.    Plan:  Diet as tolerated.  PPIs.   Trending H/H-transfuse as needed.  Empiric ABX for now, however unlikely due to infectious process.  Paracentesis yesterday.    Would consider MRI of the head? - brain mets?   Refusing any blood transfusions/echo at this time until she speaks with Dr. Samuel.   Palliative involved.   Supportive care.    Discussed with nurse, pt, Dr. Downs, and Dr. Maza. 79 y/o female with stage 3 breast ca, now with nausea/vomiting, and recent CT of abd/pel w/ insignificant findings noted.      Impression:  Unlikely infectious, and doubt IBS-more likely tumor infiltrate of SB, ?mets.  Acute on chronic anemia.    Plan:  Diet as tolerated.  PPIs.   Trending H/H-transfuse as needed.  Empiric ABX for now, however unlikely due to infectious process.  Paracentesis yesterday.    Would consider CT vs MRI of the head? - brain mets?   Refusing any blood transfusions/echo at this time until she speaks with Dr. Samuel.   Palliative involved.   Supportive care.    Discussed with nurse, pt, Dr. Downs, and Dr. Maza.

## 2018-03-14 NOTE — PROGRESS NOTE ADULT - SUBJECTIVE AND OBJECTIVE BOX
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 Pt presented to Dr Alexander Marsh office yesterday after she was having abovementioned symptoms and was sent to [facility] radiology [place] for abd CT and was found to have extensive omental implants, peritoneal carcinomatosis, enlarging ascites and acute ileitis. CT findings were also concerning for superficial femoral thrombosis however DVT could not be ruled out.   Denies fevers. Had some abd pain, noticed increasing abd girth. Denies SOB. No melena or hematochezia.     3/14: pt noted to be confused per nursing overnight however she appears alert and awake and engageable during my exam. DTRs at bedside. PT had refused transfusions or any other scans o/n as recc by this writer. D/W her primary oncologist who at this point recommends supportive care/hospice d/t her progressive disease.         social: social ETOH, no tobacco or drugs  ShX: mastectomy, LN dissection, L nephrostomy tube, pyloric stenosis correction/sx at age 19  Fhx: NC to this admission (13 Mar 2018 09:08)        REVIEW OF SYSTEMS:    CONSTITUTIONAL: No weakness, fevers or chills  EYES/ENT: No visual changes;  No vertigo or throat pain   NECK: No pain or stiffness  RESPIRATORY: No cough, wheezing, hemoptysis; No shortness of breath  CARDIOVASCULAR: No chest pain or palpitations  GASTROINTESTINAL: No abdominal or epigastric pain. No nausea, vomiting, or hematemesis; No diarrhea or constipation. No melena or hematochezia.  GENITOURINARY: No dysuria, frequency or hematuria  NEUROLOGICAL: No numbness or weakness  SKIN: No itching, burning, rashes, or lesions   All other review of systems is negative unless indicated above        ICU Vital Signs Last 24 Hrs  T(C): 37 (14 Mar 2018 13:31), Max: 37.1 (13 Mar 2018 20:23)  T(F): 98.6 (14 Mar 2018 13:31), Max: 98.8 (13 Mar 2018 20:23)  HR: 85 (14 Mar 2018 13:31) (79 - 85)  BP: 106/63 (14 Mar 2018 13:31) (106/63 - 135/68)  BP(mean): --  ABP: --  ABP(mean): --  RR: 18 (14 Mar 2018 13:31) (18 - 18)  SpO2: 98% (14 Mar 2018 13:31) (97% - 98%)            PHYSICAL EXAM:    Constitutional: NAD, awake and alert, weak/frail, temporal wasting  HEENT: PERR, EOMI,   Neck: Soft and supple, No LAD, No JVD  Respiratory: Breath sounds are clear bilaterally, No wheezing, rales or rhonchi  Cardiovascular: S1 and S2, regular rate and rhythm, no Murmurs, gallops or rubs  Gastrointestinal: minimaldistention, pain located in umbilical region  Extremities: No peripheral edema  Vascular: 2+ peripheral pulses  Neurological: A/O x 3, no focal deficits  Musculoskeletal: 5/5 strength b/l upper and lower extremities  Skin: No rashes    MEDICATIONS:  MEDICATIONS  (STANDING):  ciprofloxacin   IVPB 400 milliGRAM(s) IV Intermittent every 12 hours  influenza   Vaccine 0.5 milliLiter(s) IntraMuscular once  metroNIDAZOLE  IVPB 500 milliGRAM(s) IV Intermittent every 8 hours  metroNIDAZOLE  IVPB      pantoprazole    Tablet 40 milliGRAM(s) Oral before breakfast  sodium chloride 0.9%. 1000 milliLiter(s) (75 mL/Hr) IV Continuous <Continuous>  sodium ferric gluconate complex IVPB 125 milliGRAM(s) IV Intermittent daily      LABS: All Labs Reviewed:                        7.2    5.75  )-----------( 313      ( 14 Mar 2018 06:03 )             23.6     03-14    141  |  107  |  12  ----------------------------<  119<H>  3.0<L>   |  26  |  0.70    Ca    7.7<L>      14 Mar 2018 06:03    TPro  4.8<L>  /  Alb  2.3<L>  /  TBili  0.2  /  DBili  x   /  AST  11<L>  /  ALT  46  /  AlkPhos  47  03-14    PT/INR - ( 14 Mar 2018 06:03 )   PT: 11.7 sec;   INR: 1.08 ratio         PTT - ( 12 Mar 2018 23:13 )  PTT:23.5 sec          Blood Culture: 03-13 @ 12:00  Organism --  Gram Stain Blood -- Gram Stain   polymorphonuclear leukocytes seen per low power field  No organisms seen per oil power field  by cytocentrifuge  Specimen Source .Body Fluid ascitic fluid  Culture-Blood --        RADIOLOGY/EKG:    < from: Xray Chest 1 View AP/PA. 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 Pt presented to Dr Alexander Marsh office yesterday after she was having abovementioned symptoms and was sent to [facility] radiology [place] for abd CT and was found to have extensive omental implants, peritoneal carcinomatosis, enlarging ascites and acute ileitis. CT findings were also concerning for superficial femoral thrombosis however DVT could not be ruled out.   Denies fevers. Had some abd pain, noticed increasing abd girth. Denies SOB. No melena or hematochezia.     3/14: pt noted to be confused per nursing overnight however she appears alert and awake and engageable during my exam. DTRs at bedside. PT had refused transfusions or any other scans o/n as recc by this writer. D/W her primary oncologist who at this point recommends supportive care/hospice d/t her progressive disease.   Having abd pain today with vomiting.         social: social ETOH, no tobacco or drugs  ShX: mastectomy, LN dissection, L nephrostomy tube, pyloric stenosis correction/sx at age 19  Fhx: NC to this admission (13 Mar 2018 09:08)        REVIEW OF SYSTEMS:    CONSTITUTIONAL: No weakness, fevers or chills  EYES/ENT: No visual changes;  No vertigo or throat pain   NECK: No pain or stiffness  RESPIRATORY: No cough, wheezing, hemoptysis; No shortness of breath  CARDIOVASCULAR: No chest pain or palpitations  GASTROINTESTINAL: No abdominal or epigastric pain. No nausea, vomiting, or hematemesis; No diarrhea or constipation. No melena or hematochezia.  GENITOURINARY: No dysuria, frequency or hematuria  NEUROLOGICAL: No numbness or weakness  SKIN: No itching, burning, rashes, or lesions   All other review of systems is negative unless indicated above        ICU Vital Signs Last 24 Hrs  T(C): 37 (14 Mar 2018 13:31), Max: 37.1 (13 Mar 2018 20:23)  T(F): 98.6 (14 Mar 2018 13:31), Max: 98.8 (13 Mar 2018 20:23)  HR: 85 (14 Mar 2018 13:31) (79 - 85)  BP: 106/63 (14 Mar 2018 13:31) (106/63 - 135/68)  BP(mean): --  ABP: --  ABP(mean): --  RR: 18 (14 Mar 2018 13:31) (18 - 18)  SpO2: 98% (14 Mar 2018 13:31) (97% - 98%)            PHYSICAL EXAM:    Constitutional: NAD, awake and alert, weak/frail, temporal wasting  HEENT: PERR, EOMI,   Neck: Soft and supple, No LAD, No JVD  Respiratory: Breath sounds are clear bilaterally, No wheezing, rales or rhonchi  Cardiovascular: S1 and S2, regular rate and rhythm, no Murmurs, gallops or rubs  Gastrointestinal: minimaldistention, pain located in umbilical region  Extremities: No peripheral edema  Vascular: 2+ peripheral pulses  Neurological: A/O x 3, no focal deficits  Musculoskeletal: 5/5 strength b/l upper and lower extremities  Skin: No rashes    MEDICATIONS:  MEDICATIONS  (STANDING):  ciprofloxacin   IVPB 400 milliGRAM(s) IV Intermittent every 12 hours  influenza   Vaccine 0.5 milliLiter(s) IntraMuscular once  metroNIDAZOLE  IVPB 500 milliGRAM(s) IV Intermittent every 8 hours  metroNIDAZOLE  IVPB      pantoprazole    Tablet 40 milliGRAM(s) Oral before breakfast  sodium chloride 0.9%. 1000 milliLiter(s) (75 mL/Hr) IV Continuous <Continuous>  sodium ferric gluconate complex IVPB 125 milliGRAM(s) IV Intermittent daily      LABS: All Labs Reviewed:                        7.2    5.75  )-----------( 313      ( 14 Mar 2018 06:03 )             23.6     03-14    141  |  107  |  12  ----------------------------<  119<H>  3.0<L>   |  26  |  0.70    Ca    7.7<L>      14 Mar 2018 06:03    TPro  4.8<L>  /  Alb  2.3<L>  /  TBili  0.2  /  DBili  x   /  AST  11<L>  /  ALT  46  /  AlkPhos  47  03-14    PT/INR - ( 14 Mar 2018 06:03 )   PT: 11.7 sec;   INR: 1.08 ratio         PTT - ( 12 Mar 2018 23:13 )  PTT:23.5 sec          Blood Culture: 03-13 @ 12:00  Organism --  Gram Stain Blood -- Gram Stain   polymorphonuclear leukocytes seen per low power field  No organisms seen per oil power field  by cytocentrifuge  Specimen Source .Body Fluid ascitic fluid  Culture-Blood --        RADIOLOGY/EKG:    < from: Xray Chest 1 View AP/PA. (03.12.18 @ 22:31) >    Impression:    No acute disease    < end of copied text >

## 2018-03-14 NOTE — PROGRESS NOTE ADULT - SUBJECTIVE AND OBJECTIVE BOX
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 Pt presented to Dr Alexander Marsh office yesterday after she was having abovementioned symptoms and was sent to [facility] radiology [place] for abd CT and was found to have extensive omental implants, peritoneal carcinomatosis, enlarging ascites and acute ileitis. CT findings were also concerning for superficial femoral thrombosis however DVT could not be ruled out.   Denies fevers. Had some abd pain, noticed increasing abd girth. Denies SOB. No melena or hematochezia.     3/14/2018-  Pt reports feeling lethargic/weak today.  Denies abdominal pain, but "discomfort on palpation."  However, pt may be unreliable due to forgetfulness  Per Lexii, RN-  +nausea w/ x2 episodes of coffee ground emesis.  x1 episode of diarrhea last night, denies hematochezia.  Denies any fevers, CP, or SOB.  Reports she does not want to do anything until she speak with Dr. Croft.     social: social ETOH, no tobacco or drugs  ShX: mastectomy, LN dissection, L nephrostomy tube, pyloric stenosis correction/sx at age 19  Fhx: NC to this admission (13 Mar 2018 09:08)    PAST MEDICAL & SURGICAL HISTORY:  Metastatic breast cancer  Breast CA  H/O mastectomy    MEDICATIONS  (STANDING):  ciprofloxacin   IVPB 400 milliGRAM(s) IV Intermittent every 12 hours  influenza   Vaccine 0.5 milliLiter(s) IntraMuscular once  metroNIDAZOLE  IVPB 500 milliGRAM(s) IV Intermittent every 8 hours  metroNIDAZOLE  IVPB      pantoprazole    Tablet 40 milliGRAM(s) Oral before breakfast  potassium chloride    Tablet ER 40 milliEquivalent(s) Oral once  sodium chloride 0.9%. 1000 milliLiter(s) (75 mL/Hr) IV Continuous <Continuous>    MEDICATIONS  (PRN):  acetaminophen   Tablet. 650 milliGRAM(s) Oral every 6 hours PRN Mild Pain (1 - 3)  oxyCODONE    5 mG/acetaminophen 325 mG 1 Tablet(s) Oral every 4 hours PRN Moderate Pain (4 - 6)    Allergies    No Known Allergies    REVIEW OF SYSTEMS:  CONSTITUTIONAL: weakness, lethargic denies fevers or chills.   RESPIRATORY: No cough, wheezing, hemoptysis; No shortness of breath  CARDIOVASCULAR: No chest pain or palpitations  GASTROINTESTINAL: As per HPI.  All other review of systems is negative unless indicated above.    Vital Signs Last 24 Hrs  T(C): 36.7 (14 Mar 2018 05:10), Max: 37.1 (13 Mar 2018 20:23)  T(F): 98.1 (14 Mar 2018 05:10), Max: 98.8 (13 Mar 2018 20:23)  HR: 80 (14 Mar 2018 05:10) (76 - 80)  BP: 135/68 (14 Mar 2018 05:10) (110/51 - 135/68)  BP(mean): --  RR: 18 (14 Mar 2018 05:10) (16 - 18)  SpO2: 97% (14 Mar 2018 05:10) (97% - 100%)    PHYSICAL EXAM:  Constitutional: Appears uncomfortable/chronically ill with temporal wasting.   Respiratory: CTAB  Cardiovascular: S1 and S2, RRR, no M/G/R  Gastrointestinal: generalized tenderness, +BS    LABS:                        7.2    5.75  )-----------( 313      ( 14 Mar 2018 06:03 )             23.6     03-14    141  |  107  |  12  ----------------------------<  119<H>  3.0<L>   |  26  |  0.70    Ca    7.7<L>      14 Mar 2018 06:03    TPro  4.8<L>  /  Alb  2.3<L>  /  TBili  0.2  /  DBili  x   /  AST  11<L>  /  ALT  46  /  AlkPhos  47  03-14    PT/INR - ( 14 Mar 2018 06:03 )   PT: 11.7 sec;   INR: 1.08 ratio         PTT - ( 12 Mar 2018 23:13 )  PTT:23.5 sec  LIVER FUNCTIONS - ( 14 Mar 2018 06:03 )  Alb: 2.3 g/dL / Pro: 4.8 gm/dL / ALK PHOS: 47 U/L / ALT: 46 U/L / AST: 11 U/L / GGT: x             RADIOLOGY & ADDITIONAL STUDIES:

## 2018-03-14 NOTE — PROGRESS NOTE ADULT - ASSESSMENT
Advanced breast cancer  Omental metastases  Iron deficient/  anemia  Hypoalbuminemia / malnutrition  Poor  performance status    A/P    IV iron  Palliative evaluation  ? Anti estrogen therapy

## 2018-03-14 NOTE — PROVIDER CONTACT NOTE (OTHER) - RECOMMENDATIONS
Dr. Mejaí aware, will transfuse PRBC as per order. Dr. Mejía aware, order for 1 unit PRBC. Pt declined transfusion after discussion for risks/benefits- pt would like to speak to Dr. Samuel before she gets transfusion.

## 2018-03-14 NOTE — PROGRESS NOTE ADULT - SUBJECTIVE AND OBJECTIVE BOX
INTERVAL HISTORY:    76 yo female with recurrent extensive lobular breast cancer , now with omental metastases  Has poor appetite, fatigue; advised transfusion for anemia, and has declined.          REVIEW OF SYSTEMS:      Allergies    No Known Allergies    Intolerances        MEDICATIONS  (STANDING):  ciprofloxacin   IVPB 400 milliGRAM(s) IV Intermittent every 12 hours  influenza   Vaccine 0.5 milliLiter(s) IntraMuscular once  metroNIDAZOLE  IVPB 500 milliGRAM(s) IV Intermittent every 8 hours  metroNIDAZOLE  IVPB      pantoprazole    Tablet 40 milliGRAM(s) Oral before breakfast  potassium chloride    Tablet ER 40 milliEquivalent(s) Oral once  sodium chloride 0.9%. 1000 milliLiter(s) (75 mL/Hr) IV Continuous <Continuous>    MEDICATIONS  (PRN):  acetaminophen   Tablet. 650 milliGRAM(s) Oral every 6 hours PRN Mild Pain (1 - 3)  oxyCODONE    5 mG/acetaminophen 325 mG 1 Tablet(s) Oral every 4 hours PRN Moderate Pain (4 - 6)      Vital Signs Last 24 Hrs  T(C): 36.7 (14 Mar 2018 05:10), Max: 37.1 (13 Mar 2018 20:23)  T(F): 98.1 (14 Mar 2018 05:10), Max: 98.8 (13 Mar 2018 20:23)  HR: 80 (14 Mar 2018 05:10) (76 - 80)  BP: 135/68 (14 Mar 2018 05:10) (110/51 - 135/68)  BP(mean): --  RR: 18 (14 Mar 2018 05:10) (16 - 18)  SpO2: 97% (14 Mar 2018 05:10) (97% - 100%)    PHYSICAL EXAM:    GENERAL: NAD,   HEAD:  Atraumatic, Normocephalic  EYES: EOMI, PERRLA, conjunctiva and sclera clear    NECK: Supple, No JVD, Normal thyroid  NERVOUS SYSTEM:    CHEST/LUNG: Clear to percussion bilaterally; No rales, rhonchi,   HEART: Regular rate and rhythm;   ABDOMEN: Soft, Nontender.  EXTREMITIES:   edema:-  LYMPH: No lymphadenopathy noted        LABS:                        7.2    5.75  )-----------( 313      ( 14 Mar 2018 06:03 )             23.6     03-14    141  |  107  |  12  ----------------------------<  119<H>  3.0<L>   |  26  |  0.70    Ca    7.7<L>      14 Mar 2018 06:03    TPro  4.8<L>  /  Alb  2.3<L>  /  TBili  0.2  /  DBili  x   /  AST  11<L>  /  ALT  46  /  AlkPhos  47  03-14    PT/INR - ( 14 Mar 2018 06:03 )   PT: 11.7 sec;   INR: 1.08 ratio         PTT - ( 12 Mar 2018 23:13 )  PTT:23.5 sec  Urinalysis Basic - ( 12 Mar 2018 23:13 )    Color: Yellow / Appearance: Clear / S.010 / pH: x  Gluc: x / Ketone: Trace  / Bili: Negative / Urobili: Negative mg/dL   Blood: x / Protein: Negative mg/dL / Nitrite: Negative   Leuk Esterase: Negative / RBC: x / WBC x   Sq Epi: x / Non Sq Epi: x / Bacteria: x          RADIOLOGY & ADDITIONAL STUDIES:    PATHOLOGY: INTERVAL HISTORY:    76 yo female with recurrent extensive lobular breast cancer , now with omental metastases  Has poor appetite, fatigue; advised transfusion for anemia, and has declined. Labs   reveal low serum iron and saturation.     REVIEW OF SYSTEMS:    Generalized weakness , poor appetite  No pains  No headaches  No abdominal pain        Allergies    No Known Allergies    Intolerances        MEDICATIONS  (STANDING):  ciprofloxacin   IVPB 400 milliGRAM(s) IV Intermittent every 12 hours  influenza   Vaccine 0.5 milliLiter(s) IntraMuscular once  metroNIDAZOLE  IVPB 500 milliGRAM(s) IV Intermittent every 8 hours  metroNIDAZOLE  IVPB      pantoprazole    Tablet 40 milliGRAM(s) Oral before breakfast  potassium chloride    Tablet ER 40 milliEquivalent(s) Oral once  sodium chloride 0.9%. 1000 milliLiter(s) (75 mL/Hr) IV Continuous <Continuous>    MEDICATIONS  (PRN):  acetaminophen   Tablet. 650 milliGRAM(s) Oral every 6 hours PRN Mild Pain (1 - 3)  oxyCODONE    5 mG/acetaminophen 325 mG 1 Tablet(s) Oral every 4 hours PRN Moderate Pain (4 - 6)      Vital Signs Last 24 Hrs  T(C): 36.7 (14 Mar 2018 05:10), Max: 37.1 (13 Mar 2018 20:23)  T(F): 98.1 (14 Mar 2018 05:10), Max: 98.8 (13 Mar 2018 20:23)  HR: 80 (14 Mar 2018 05:10) (76 - 80)  BP: 135/68 (14 Mar 2018 05:10) (110/51 - 135/68)  BP(mean): --  RR: 18 (14 Mar 2018 05:10) (16 - 18)  SpO2: 97% (14 Mar 2018 05:10) (97% - 100%)    PHYSICAL EXAM:    GENERAL: NAD, Frail , chronically ill appearing ECOG 3-4  HEAD:  Atraumatic, Normocephalic  EYES: EOMI, PERRLA, conjunctiva and sclera clear    NECK: Supple, No JVD, Normal thyroid  NERVOUS SYSTEM: Somnolent  CHEST/LUNG: Clear to percussion bilaterally; No rales, rhonchi,   HEART: Regular rate and rhythm;   ABDOME Nontender.  EXTREMITIES:  neg phlebitis  LYMPH: No lymphadenopathy noted        LABS:                        7.2    5.75  )-----------( 313      ( 14 Mar 2018 06:03 )             23.6     03-14    141  |  107  |  12  ----------------------------<  119<H>  3.0<L>   |  26  |  0.70    Ca    7.7<L>      14 Mar 2018 06:03    TPro  4.8<L>  /  Alb  2.3<L>  /  TBili  0.2  /  DBili  x   /  AST  11<L>  /  ALT  46  /  AlkPhos  47  03-14    PT/INR - ( 14 Mar 2018 06:03 )   PT: 11.7 sec;   INR: 1.08 ratio         PTT - ( 12 Mar 2018 23:13 )  PTT:23.5 sec  Urinalysis Basic - ( 12 Mar 2018 23:13 )    Color: Yellow / Appearance: Clear / S.010 / pH: x  Gluc: x / Ketone: Trace  / Bili: Negative / Urobili: Negative mg/dL   Blood: x / Protein: Negative mg/dL / Nitrite: Negative   Leuk Esterase: Negative / RBC: x / WBC x   Sq Epi: x / Non Sq Epi: x / Bacteria: x

## 2018-03-14 NOTE — PROVIDER CONTACT NOTE (OTHER) - SITUATION
Pt with hx of stage 3 breast ca, mets to LNs, now peritoneal carcinomatosis, and hx of brain anuerysm and subdural hematoma.

## 2018-03-15 ENCOUNTER — TRANSCRIPTION ENCOUNTER (OUTPATIENT)
Age: 78
End: 2018-03-15

## 2018-03-15 VITALS
RESPIRATION RATE: 18 BRPM | TEMPERATURE: 98 F | HEART RATE: 85 BPM | SYSTOLIC BLOOD PRESSURE: 104 MMHG | OXYGEN SATURATION: 95 % | DIASTOLIC BLOOD PRESSURE: 62 MMHG

## 2018-03-15 LAB
HCT VFR BLD CALC: 20.3 % — CRITICAL LOW (ref 34.5–45)
HGB BLD-MCNC: 6 G/DL — CRITICAL LOW (ref 11.5–15.5)
MCHC RBC-ENTMCNC: 24.9 PG — LOW (ref 27–34)
MCHC RBC-ENTMCNC: 29.6 GM/DL — LOW (ref 32–36)
MCV RBC AUTO: 84.2 FL — SIGNIFICANT CHANGE UP (ref 80–100)
NRBC # BLD: 0 /100 WBCS — SIGNIFICANT CHANGE UP (ref 0–0)
PLATELET # BLD AUTO: 281 K/UL — SIGNIFICANT CHANGE UP (ref 150–400)
RBC # BLD: 2.41 M/UL — LOW (ref 3.8–5.2)
RBC # FLD: 18 % — HIGH (ref 10.3–14.5)
WBC # BLD: 9.51 K/UL — SIGNIFICANT CHANGE UP (ref 3.8–10.5)
WBC # FLD AUTO: 9.51 K/UL — SIGNIFICANT CHANGE UP (ref 3.8–10.5)

## 2018-03-15 RX ORDER — METRONIDAZOLE 500 MG
1 TABLET ORAL
Qty: 15 | Refills: 0 | OUTPATIENT
Start: 2018-03-15 | End: 2018-03-19

## 2018-03-15 RX ORDER — EXEMESTANE 25 MG/1
1 TABLET, SUGAR COATED ORAL
Qty: 0 | Refills: 0 | COMMUNITY

## 2018-03-15 RX ORDER — ONDANSETRON 8 MG/1
1 TABLET, FILM COATED ORAL
Qty: 120 | Refills: 0 | OUTPATIENT
Start: 2018-03-15 | End: 2018-04-13

## 2018-03-15 RX ORDER — FERROUS SULFATE 325(65) MG
1 TABLET ORAL
Qty: 0 | Refills: 0 | COMMUNITY

## 2018-03-15 RX ORDER — MOXIFLOXACIN HYDROCHLORIDE TABLETS, 400 MG 400 MG/1
1 TABLET, FILM COATED ORAL
Qty: 10 | Refills: 0 | OUTPATIENT
Start: 2018-03-15 | End: 2018-03-19

## 2018-03-15 RX ORDER — ACETAMINOPHEN 500 MG
2 TABLET ORAL
Qty: 0 | Refills: 0 | COMMUNITY
Start: 2018-03-15

## 2018-03-15 RX ORDER — PANTOPRAZOLE SODIUM 20 MG/1
1 TABLET, DELAYED RELEASE ORAL
Qty: 30 | Refills: 0 | OUTPATIENT
Start: 2018-03-15 | End: 2018-04-13

## 2018-03-15 RX ORDER — ALPRAZOLAM 0.25 MG
1 TABLET ORAL
Qty: 90 | Refills: 0 | OUTPATIENT
Start: 2018-03-15 | End: 2018-04-13

## 2018-03-15 RX ADMIN — Medication 100 MILLIGRAM(S): at 05:00

## 2018-03-15 RX ADMIN — SODIUM CHLORIDE 75 MILLILITER(S): 9 INJECTION INTRAMUSCULAR; INTRAVENOUS; SUBCUTANEOUS at 00:22

## 2018-03-15 RX ADMIN — PANTOPRAZOLE SODIUM 40 MILLIGRAM(S): 20 TABLET, DELAYED RELEASE ORAL at 05:00

## 2018-03-15 RX ADMIN — Medication 110 MILLIGRAM(S): at 13:00

## 2018-03-15 RX ADMIN — Medication 200 MILLIGRAM(S): at 06:17

## 2018-03-15 NOTE — CHART NOTE - NSCHARTNOTEFT_GEN_A_CORE
Called by nursing for Hgb 6.0. Pt refusing to accept PRBC because her other “oncologist said not to” (not Dr Silva). Pt could not recall name of other oncologist or his number, but stated she spoke to him yesterday. Pt was advised that there is no counter-indication for receiving PRBC at this time. Pt said she will not discuss this further until her daughter comes in this AM.

## 2018-03-15 NOTE — DISCHARGE NOTE ADULT - MEDICATION SUMMARY - MEDICATIONS TO TAKE
I will START or STAY ON the medications listed below when I get home from the hospital:    Flagyl 500 mg oral tablet  -- 1 tab(s) by mouth 3 times a day   -- Do not drink alcoholic beverages when taking this medication.  Finish all this medication unless otherwise directed by prescriber.  May discolor urine or feces.    -- Indication: For ileitis    oxyCODONE-acetaminophen 5 mg-325 mg oral tablet  -- 1 tab(s) by mouth every 4 hours, As needed, Moderate Pain (4 - 6) MDD:mdd: 6  -- Indication: For pain    acetaminophen 325 mg oral tablet  -- 2 tab(s) by mouth every 6 hours, As needed, Mild Pain (1 - 3)  -- Indication: For pain    Percocet 10/325 oral tablet  -- 1 tab(s) by mouth every 8 hours, As Needed MDD:3   for pain 7-10  -- Caution federal law prohibits the transfer of this drug to any person other  than the person for whom it was prescribed.  May cause drowsiness.  Alcohol may intensify this effect.  Use care when operating dangerous machinery.  This prescription cannot be refilled.  This product contains acetaminophen.  Do not use  with any other product containing acetaminophen to prevent possible liver damage.  Using more of this medication than prescribed may cause serious breathing problems.    -- Indication: For pain    Zofran ODT 8 mg oral tablet, disintegrating  -- 1 tab(s) by mouth every 6 hours   -- Indication: For nausea    Xanax 0.25 mg oral tablet  -- 1 tab(s) by mouth every 8 hours, As Needed -agitation MDD:3  -- Indication: For anxiety/agitation    ferrous sulfate 325 mg (65 mg elemental iron) oral tablet  -- 1 tab(s) by mouth 3 times a day  -- Indication: For anemia    Protonix 40 mg oral delayed release tablet  -- 1 tab(s) by mouth once a day   -- It is very important that you take or use this exactly as directed.  Do not skip doses or discontinue unless directed by your doctor.  Obtain medical advice before taking any non-prescription drugs as some may affect the action of this medication.  Swallow whole.  Do not crush.    -- Indication: For for your stomach    Cipro 500 mg oral tablet  -- 1 tab(s) by mouth 2 times a day   -- Avoid prolonged or excessive exposure to direct and/or artificial sunlight while taking this medication.  Check with your doctor before becoming pregnant.  Do not take dairy products, antacids, or iron preparations within one hour of this medication.  Finish all this medication unless otherwise directed by prescriber.  Medication should be taken with plenty of water.    -- Indication: For ileitis    Multiple Vitamins oral capsule  -- 1 cap(s) by mouth once a day  -- Indication: For supplement

## 2018-03-15 NOTE — PROVIDER CONTACT NOTE (CRITICAL VALUE NOTIFICATION) - ACTION/TREATMENT ORDERED:
Critical value reported to Dr. Mejía. He will speak to pt.
Discontinue Heparin SQ.     Start intermittent Sleeve bilaterlly  Repeat CBC at 10 pm

## 2018-03-15 NOTE — PROGRESS NOTE ADULT - SUBJECTIVE AND OBJECTIVE BOX
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 Pt presented to Dr Alexander Marsh office yesterday after she was having abovementioned symptoms and was sent to [facility] radiology [place] for abd CT and was found to have extensive omental implants, peritoneal carcinomatosis, enlarging ascites and acute ileitis. CT findings were also concerning for superficial femoral thrombosis however DVT could not be ruled out.   Denies fevers. Had some abd pain, noticed increasing abd girth. Denies SOB. No melena or hematochezia.     3/14: pt noted to be confused per nursing overnight however she appears alert and awake and engageable during my exam. DTRs at bedside. PT had refused transfusions or any other scans o/n as recc by this writer. D/W her primary oncologist who at this point recommends supportive care/hospice d/t her progressive disease.   Having abd pain today with vomiting.   3/15: Discussed advanced directives with patient. Wishes are to be made DNR/DNI/Do not return to hospital. She wishes to go home with hospice/comfort care. Case d/w Dr Samuel and agrees with plan. DTRs aware.         social: social ETOH, no tobacco or drugs  ShX: mastectomy, LN dissection, L nephrostomy tube, pyloric stenosis correction/sx at age 19  Fhx: NC to this admission (13 Mar 2018 09:08)        REVIEW OF SYSTEMS:    CONSTITUTIONAL: No weakness, fevers or chills  EYES/ENT: No visual changes;  No vertigo or throat pain   NECK: No pain or stiffness  RESPIRATORY: No cough, wheezing, hemoptysis; No shortness of breath  CARDIOVASCULAR: No chest pain or palpitations  GASTROINTESTINAL: No abdominal or epigastric pain. No nausea, vomiting, or hematemesis; No diarrhea or constipation. No melena or hematochezia.  GENITOURINARY: No dysuria, frequency or hematuria  NEUROLOGICAL: No numbness or weakness  SKIN: No itching, burning, rashes, or lesions   All other review of systems is negative unless indicated above        ICU Vital Signs Last 24 Hrs  T(C): 36.8 (15 Mar 2018 05:18), Max: 37 (14 Mar 2018 13:31)  T(F): 98.3 (15 Mar 2018 05:18), Max: 98.6 (14 Mar 2018 13:31)  HR: 86 (15 Mar 2018 05:18) (82 - 86)  BP: 142/57 (15 Mar 2018 05:18) (106/63 - 142/57)  BP(mean): --  ABP: --  ABP(mean): --  RR: 18 (15 Mar 2018 05:18) (18 - 18)  SpO2: 94% (15 Mar 2018 05:18) (94% - 98%)            PHYSICAL EXAM:    Constitutional: NAD, awake and alert, weak/frail, temporal wasting  HEENT: PERR, EOMI,   Neck: Soft and supple, No LAD, No JVD  Respiratory: Breath sounds are clear bilaterally, No wheezing, rales or rhonchi  Cardiovascular: S1 and S2, regular rate and rhythm, no Murmurs, gallops or rubs  Gastrointestinal: minimaldistention, pain located in umbilical region  Extremities: No peripheral edema  Vascular: 2+ peripheral pulses  Neurological: A/O x 3, no focal deficits  Musculoskeletal: 5/5 strength b/l upper and lower extremities  Skin: No rashes    MEDICATIONS:  MEDICATIONS  (STANDING):  ciprofloxacin   IVPB 400 milliGRAM(s) IV Intermittent every 12 hours  influenza   Vaccine 0.5 milliLiter(s) IntraMuscular once  metroNIDAZOLE  IVPB 500 milliGRAM(s) IV Intermittent every 8 hours  metroNIDAZOLE  IVPB      pantoprazole    Tablet 40 milliGRAM(s) Oral before breakfast  sodium chloride 0.9%. 1000 milliLiter(s) (75 mL/Hr) IV Continuous <Continuous>  sodium ferric gluconate complex IVPB 125 milliGRAM(s) IV Intermittent daily      LABS: All Labs Reviewed:                        7.2    5.75  )-----------( 313      ( 14 Mar 2018 06:03 )             23.6     03-14    141  |  107  |  12  ----------------------------<  119<H>  3.0<L>   |  26  |  0.70    Ca    7.7<L>      14 Mar 2018 06:03    TPro  4.8<L>  /  Alb  2.3<L>  /  TBili  0.2  /  DBili  x   /  AST  11<L>  /  ALT  46  /  AlkPhos  47  03-14    PT/INR - ( 14 Mar 2018 06:03 )   PT: 11.7 sec;   INR: 1.08 ratio         PTT - ( 12 Mar 2018 23:13 )  PTT:23.5 sec          Blood Culture: 03-13 @ 12:00  Organism --  Gram Stain Blood -- Gram Stain   polymorphonuclear leukocytes seen per low power field  No organisms seen per oil power field  by cytocentrifuge  Specimen Source .Body Fluid ascitic fluid  Culture-Blood --        RADIOLOGY/EKG:    < from: Xray Chest 1 View AP/PA. (03.12.18 @ 22:31) >    Impression:    No acute disease    < end of copied text >

## 2018-03-15 NOTE — PROVIDER CONTACT NOTE (CRITICAL VALUE NOTIFICATION) - SITUATION
Pt with hx of breast ca, mets to LNs, and peritoneal carcinomatosis. Hx of brain anuerysm and subdural hematoma.

## 2018-03-15 NOTE — DISCHARGE NOTE ADULT - PATIENT PORTAL LINK FT
You can access the [facility] Patient Portal, offered by [facility], by registering with the following website: [email or web address]

## 2018-03-15 NOTE — DISCHARGE NOTE ADULT - MEDICATION SUMMARY - MEDICATIONS TO STOP TAKING
I will STOP taking the medications listed below when I get home from the hospital:    exemestane 25 mg oral tablet  -- 1 tab(s) by mouth once a day    cefuroxime 500 mg oral tablet  -- 1 tab(s) by mouth 2 times a day   -- Finish all this medication unless otherwise directed by prescriber.  Medication should be taken with plenty of water.  Take with food or milk.

## 2018-03-15 NOTE — DISCHARGE NOTE ADULT - HOSPITAL COURSE
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 Pt presented to Dr Alexander Marsh office yesterday after she was having abovementioned symptoms and was sent to [facility] for abd CT and was found to have extensive omental implants, peritoneal carcinomatosis, enlarging ascites and acute ileitis. CT findings were also concerning for superficial femoral thrombosis however DVT could not be ruled out.   Denies fevers. Had some abd pain, noticed increasing abd girth. Denies SOB. No melena or hematochezia.   3/14: pt noted to be confused per nursing overnight however she appears alert and awake and engageable during my exam. DTRs at bedside. PT had refused transfusions or any other scans o/n as recc by this writer. D/W her primary oncologist who at this point recommends supportive care/hospice d/t her progressive disease.   Having abd pain today with vomiting.   3/15: Discussed advanced directives with patient. Wishes are to be made DNR/DNI/Do not return to hospital. She wishes to go home with hospice/comfort care. Case d/w Dr Samuel and agrees with plan. DTRs aware.   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 Pt presented to Dr Alexander Marsh office yesterday after she was having abovementioned symptoms and was sent to [facility] for abd CT and was found to have extensive omental implants, peritoneal carcinomatosis, enlarging ascites and acute ileitis. CT findings were also concerning for superficial femoral thrombosis however DVT could not be ruled out.       A/P:    Nausea/Vomiting  Breast CA with mets -Advanced progressive disease  Carcinomatosis  Ascites s/p abd paracentesis  Omental implants  Acute ilieitis r/o infectious vs inflammatory  Acute on chronic anemia (baseline 9-10)  Metabolic encephalopathy  PLAN:  CT noted and confirms metastatic disease with omental spread, ascites and carcinomatosis-stage IV disease  No role for further therapy as per discussion with oncology d/t progression and her poor performance  Given her marked deterioration anorexia, disease spread, and poor functional status, she stands a grave prognosis and could not withstand any further treatments. Hospice eligible  s/p abd paracentesis for large ascites  Doppler-> neg for DVT  Percocet 5/325 for pain 4-6, Percocet 10/325 pain scale 7-10  Xanax 0.25mg po TID PRN for agitation/anxiety  Anti-emetics PRN  No further blood draws and discuss code status with patient today  Discussed advanced directives with patient. Wishes are to be made DNR/DNI/Do not return to hospital. She wishes to go home with hospice/comfort care. Case d/w Dr Samuel and agrees with plan as any further treamtent would be futile given her progressive and advanced disease DTRs aware.         DVT px/GI Px  Total dc time 50 min  PCP Dr valdez aware

## 2018-03-15 NOTE — PROGRESS NOTE ADULT - ATTENDING COMMENTS
GOC/advanced directive d/w family. Pt wishes are to return home with home hospice as soon as possible. GOC + PE = 85 min    DNR/DNI
GOC/advanced directive d/w family. Pt wishes are to return home with home hospice as soon as possible. GOC + PE = 85 min    Code status not addressed as family became very overwhelmed with news of poor prognosis. Will need to re-address soon.

## 2018-03-15 NOTE — PROGRESS NOTE ADULT - ASSESSMENT
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 Pt presented to Dr Alexander Marsh office yesterday after she was having abovementioned symptoms and was sent to [facility] radiology [place] for abd CT and was found to have extensive omental implants, peritoneal carcinomatosis, enlarging ascites and acute ileitis. CT findings were also concerning for superficial femoral thrombosis however DVT could not be ruled out.       A/P:    Nausea/Vomiting  Breast CA with mets -Advanced progressive disease  Carcinomatosis  Ascites  Omental implants  Acute ilieitis r/o infectious vs inflammatory  Acute on chronic anemia (baseline 9-10)  Metabolic encephalopathy      PLAN:  CT noted and confirms metastatic disease with omental spread, ascites and carcinomatosis-stage IV disease  No role for further therapy as per discussion with oncology d/t progression and her poor performance  Given her marked deteroration, anorexia, disease spread, and poor functional status, she stands a grave prognosis and could not withstand any further treatments. Hospice eligible  s/p abd paracentesis for large ascites  Doppler-> neg for DVT  Morphine 2mg q6H PRN for pain  Xanax 0.25mg po BID for agitation/anxiety  Anti-emetics PRN  No further blood draws and discuss code status with patient today  Discussed advanced directives with patient. Wishes are to be made DNR/DNI/Do not return to hospital. She wishes to go home with hospice/comfort care. Case d/w Dr Samuel and agrees with plan as any further treamtent would be futile given her progressive and advanced disease DTRs aware.         DVT px/GI Px

## 2018-03-16 LAB — NON-GYN CYTOLOGY SPEC: SIGNIFICANT CHANGE UP

## 2018-03-18 LAB
CULTURE RESULTS: NO GROWTH — SIGNIFICANT CHANGE UP
SPECIMEN SOURCE: SIGNIFICANT CHANGE UP

## 2018-03-22 DIAGNOSIS — D63.8 ANEMIA IN OTHER CHRONIC DISEASES CLASSIFIED ELSEWHERE: ICD-10-CM

## 2018-03-22 DIAGNOSIS — Z91.14 PATIENT'S OTHER NONCOMPLIANCE WITH MEDICATION REGIMEN: ICD-10-CM

## 2018-03-22 DIAGNOSIS — K52.9 NONINFECTIVE GASTROENTERITIS AND COLITIS, UNSPECIFIED: ICD-10-CM

## 2018-03-22 DIAGNOSIS — Z51.5 ENCOUNTER FOR PALLIATIVE CARE: ICD-10-CM

## 2018-03-22 DIAGNOSIS — E88.09 OTHER DISORDERS OF PLASMA-PROTEIN METABOLISM, NOT ELSEWHERE CLASSIFIED: ICD-10-CM

## 2018-03-22 DIAGNOSIS — C78.6 SECONDARY MALIGNANT NEOPLASM OF RETROPERITONEUM AND PERITONEUM: ICD-10-CM

## 2018-03-22 DIAGNOSIS — Z66 DO NOT RESUSCITATE: ICD-10-CM

## 2018-03-22 DIAGNOSIS — D50.9 IRON DEFICIENCY ANEMIA, UNSPECIFIED: ICD-10-CM

## 2018-03-22 DIAGNOSIS — C50.919 MALIGNANT NEOPLASM OF UNSPECIFIED SITE OF UNSPECIFIED FEMALE BREAST: ICD-10-CM

## 2018-03-22 DIAGNOSIS — G93.41 METABOLIC ENCEPHALOPATHY: ICD-10-CM

## 2018-03-22 DIAGNOSIS — R18.8 OTHER ASCITES: ICD-10-CM

## 2018-03-22 DIAGNOSIS — E46 UNSPECIFIED PROTEIN-CALORIE MALNUTRITION: ICD-10-CM

## 2018-03-22 DIAGNOSIS — C77.9 SECONDARY AND UNSPECIFIED MALIGNANT NEOPLASM OF LYMPH NODE, UNSPECIFIED: ICD-10-CM

## 2018-04-11 LAB
CULTURE RESULTS: SIGNIFICANT CHANGE UP
SPECIMEN SOURCE: SIGNIFICANT CHANGE UP

## 2018-05-02 LAB
CULTURE RESULTS: SIGNIFICANT CHANGE UP
SPECIMEN SOURCE: SIGNIFICANT CHANGE UP

## 2019-05-29 NOTE — CONSULT NOTE ADULT - CONSTITUTIONAL
Oncology Treatment Plan:   [No treatment plan]    Medications:  Continuous Infusions:   sodium chloride 0.9%       Scheduled Meds:   calcitonin  4 Units/kg Subcutaneous BID    piperacillin-tazobactam (ZOSYN) IVPB  4.5 g Intravenous Q8H    senna-docusate 8.6-50 mg  2 tablet Oral BID    sertraline  50 mg Oral QHS    zoledronic acid (ZOMETA) IVPB  4 mg Intravenous Once     PRN Meds:dextrose 50%, dextrose 50%, glucagon (human recombinant), glucose, glucose, oxyCODONE, sodium chloride 0.9%     Review of patient's allergies indicates:   Allergen Reactions    Codeine     Norco [hydrocodone-acetaminophen] Nausea And Vomiting        Past Medical History:   Diagnosis Date    Breast cancer      Past Surgical History:   Procedure Laterality Date    BREAST SURGERY      CARDIAC CATHETERIZATION  12/14/2015    no stents    NASAL SINUS SURGERY      Pericardiocentesis- # 388 A N/A 4/30/2019    Performed by Vance Boothe [facility] at [facility]    TONSILLECTOMY       Family History     None        Tobacco Use    Smoking status: Never Smoker   Substance and Sexual Activity    Alcohol use: Yes     Alcohol/week: 3.0 oz     Types: 5 Glasses of wine per week     Comment: few times a week    Drug use: Never    Sexual activity: Yes     Birth control/protection: Post-menopausal       Review of Systems   Constitutional: Positive for activity change, appetite change and fatigue. Negative for fever.   HENT: Negative for congestion, dental problem and facial swelling.    Eyes: Negative for discharge.   Respiratory: Positive for shortness of breath. Negative for chest tightness and wheezing.    Cardiovascular: Negative for chest pain and leg swelling.   Gastrointestinal: Negative for abdominal distention and abdominal pain.   Genitourinary: Negative for difficulty urinating and dyspareunia.   Musculoskeletal: Positive for arthralgias and back pain.   Skin: Positive for color change.   Neurological: Positive for weakness and  light-headedness. Negative for numbness.   Psychiatric/Behavioral: Positive for decreased concentration. Negative for agitation.     Objective:     Vital Signs (Most Recent):  Temp: 97.3 °F (36.3 °C) (05/28/19 1833)  Pulse: 81 (05/28/19 2130)  Resp: 19 (05/28/19 2130)  BP: (!) 122/59 (05/28/19 2130)  SpO2: 99 % (05/28/19 2130) Vital Signs (24h Range):  Temp:  [97.3 °F (36.3 °C)] 97.3 °F (36.3 °C)  Pulse:  [79-83] 81  Resp:  [17-22] 19  SpO2:  [98 %-100 %] 99 %  BP: (122-139)/(59-69) 122/59     Weight: 70.3 kg (155 lb)  Body mass index is 24.28 kg/m².  Body surface area is 1.82 meters squared.      Intake/Output Summary (Last 24 hours) at 5/28/2019 2303  Last data filed at 5/28/2019 2151  Gross per 24 hour   Intake 1100 ml   Output --   Net 1100 ml       Physical Exam   Constitutional: She is oriented to person, place, and time. She appears well-developed. She appears distressed.   ECOG Performance Status Score: 3    HENT:   Head: Normocephalic.   Eyes: Pupils are equal, round, and reactive to light. Right eye exhibits no discharge. Left eye exhibits no discharge.   Neck: Normal range of motion. No JVD present.   Cardiovascular: Normal rate and regular rhythm.   Murmur (distance heart sounds) heard.  Pulmonary/Chest: Effort normal. No respiratory distress. She has no wheezes. She has no rales.   Abdominal: Soft. She exhibits no distension. There is no tenderness.   Musculoskeletal: She exhibits no edema.   Neurological: She is alert and oriented to person, place, and time. A sensory deficit is present. GCS eye subscore is 4. GCS verbal subscore is 5. GCS motor subscore is 6.   Lethargic   Motor 3/5 in bilateral upper extremities  Not cooperative with lower extremities examination        Significant Labs:   CMP:   Recent Labs   Lab 05/28/19 1919   *   K 4.2   CL 94*   CO2 30*   GLU 99   BUN 21*   CREATININE 0.7   CALCIUM 12.9*   PROT 7.9   ALBUMIN 2.1*   BILITOT 7.6*   ALKPHOS 890*   *   *    ANIONGAP 8   EGFRNONAA >60.0   , Coagulation:   Recent Labs   Lab 05/28/19 1919   INR 1.3*   APTT 35.3*     Diagnostic Results:  I have reviewed all pertinent imaging results/findings within the past 24 hours.  I have reviewed and interpreted all pertinent imaging results/findings within the past 24 hours.   detailed exam

## 2020-06-16 NOTE — PATIENT PROFILE ADULT. - NAME OF PERSON WHO ASSISTED
Pt non distressed, VSS  Neuro intact  Awaiting further plan of care at this time    Will continue to monitor    Call light within reach       Orville Barry RN  06/15/20 2015 Cristiane Latif

## 2021-03-11 NOTE — PATIENT PROFILE ADULT. - TEACHING/LEARNING CULTURAL CONSIDERATIONS
Last OV- 5-27-20  Next OV- Due now for 6 MONTH follow up   Last Filled on 10-8-20 for 6 months    Medication increased to 25 mg daily on 5-27-20 visit   Increase Toprol XL from 12.5 daily to 25 mg /d; keep a log of HR and call in 10-15 days. Per Dr. Valerio    none

## 2021-11-12 NOTE — PATIENT PROFILE ADULT. - VISION (WITH CORRECTIVE LENSES IF THE PATIENT USUALLY WEARS THEM):
Spontaneous, unlabored and symmetrical
Normal vision: sees adequately in most situations; can see medication labels, newsprint

## 2021-12-04 ENCOUNTER — EMERGENCY (EMERGENCY)
Facility: HOSPITAL | Age: 81
LOS: 1 days | Discharge: HOME | End: 2021-12-04
Attending: EMERGENCY MEDICINE
Payer: MEDICARE

## 2021-12-04 VITALS
TEMPERATURE: 98 F | DIASTOLIC BLOOD PRESSURE: 70 MMHG | HEART RATE: 110 BPM | WEIGHT: 80.03 LBS | RESPIRATION RATE: 18 BRPM | SYSTOLIC BLOOD PRESSURE: 105 MMHG | OXYGEN SATURATION: 97 % | HEIGHT: 58 IN

## 2021-12-04 DIAGNOSIS — Z90.10 ACQUIRED ABSENCE OF UNSPECIFIED BREAST AND NIPPLE: Chronic | ICD-10-CM

## 2021-12-04 LAB
ALBUMIN SERPL ELPH-MCNC: 3.5 G/DL — SIGNIFICANT CHANGE UP (ref 3.3–5.2)
ALP SERPL-CCNC: 80 U/L — SIGNIFICANT CHANGE UP (ref 40–120)
ALT FLD-CCNC: 7 U/L — SIGNIFICANT CHANGE UP
ANION GAP SERPL CALC-SCNC: 19 MMOL/L — HIGH (ref 5–17)
AST SERPL-CCNC: 33 U/L — HIGH
BASOPHILS # BLD AUTO: 0.05 K/UL — SIGNIFICANT CHANGE UP (ref 0–0.2)
BASOPHILS NFR BLD AUTO: 1 % — SIGNIFICANT CHANGE UP (ref 0–2)
BILIRUB SERPL-MCNC: 0.6 MG/DL — SIGNIFICANT CHANGE UP (ref 0.4–2)
BLD GP AB SCN SERPL QL: SIGNIFICANT CHANGE UP
BUN SERPL-MCNC: 10.8 MG/DL — SIGNIFICANT CHANGE UP (ref 8–20)
CALCIUM SERPL-MCNC: 9.5 MG/DL — SIGNIFICANT CHANGE UP (ref 8.6–10.2)
CHLORIDE SERPL-SCNC: 92 MMOL/L — LOW (ref 98–107)
CO2 SERPL-SCNC: 28 MMOL/L — SIGNIFICANT CHANGE UP (ref 22–29)
CREAT SERPL-MCNC: 1.11 MG/DL — SIGNIFICANT CHANGE UP (ref 0.5–1.3)
EOSINOPHIL # BLD AUTO: 0.07 K/UL — SIGNIFICANT CHANGE UP (ref 0–0.5)
EOSINOPHIL NFR BLD AUTO: 1.4 % — SIGNIFICANT CHANGE UP (ref 0–6)
GLUCOSE SERPL-MCNC: 102 MG/DL — HIGH (ref 70–99)
HCT VFR BLD CALC: 31.2 % — LOW (ref 34.5–45)
HGB BLD-MCNC: 9.6 G/DL — LOW (ref 11.5–15.5)
IMM GRANULOCYTES NFR BLD AUTO: 0.6 % — SIGNIFICANT CHANGE UP (ref 0–1.5)
LYMPHOCYTES # BLD AUTO: 0.61 K/UL — LOW (ref 1–3.3)
LYMPHOCYTES # BLD AUTO: 12.2 % — LOW (ref 13–44)
MCHC RBC-ENTMCNC: 29.7 PG — SIGNIFICANT CHANGE UP (ref 27–34)
MCHC RBC-ENTMCNC: 30.8 GM/DL — LOW (ref 32–36)
MCV RBC AUTO: 96.6 FL — SIGNIFICANT CHANGE UP (ref 80–100)
MONOCYTES # BLD AUTO: 0.6 K/UL — SIGNIFICANT CHANGE UP (ref 0–0.9)
MONOCYTES NFR BLD AUTO: 12 % — SIGNIFICANT CHANGE UP (ref 2–14)
NEUTROPHILS # BLD AUTO: 3.66 K/UL — SIGNIFICANT CHANGE UP (ref 1.8–7.4)
NEUTROPHILS NFR BLD AUTO: 72.8 % — SIGNIFICANT CHANGE UP (ref 43–77)
PLATELET # BLD AUTO: 391 K/UL — SIGNIFICANT CHANGE UP (ref 150–400)
POTASSIUM SERPL-MCNC: 4.3 MMOL/L — SIGNIFICANT CHANGE UP (ref 3.5–5.3)
POTASSIUM SERPL-SCNC: 4.3 MMOL/L — SIGNIFICANT CHANGE UP (ref 3.5–5.3)
PROT SERPL-MCNC: 7.1 G/DL — SIGNIFICANT CHANGE UP (ref 6.6–8.7)
RBC # BLD: 3.23 M/UL — LOW (ref 3.8–5.2)
RBC # FLD: 19.2 % — HIGH (ref 10.3–14.5)
SODIUM SERPL-SCNC: 139 MMOL/L — SIGNIFICANT CHANGE UP (ref 135–145)
WBC # BLD: 5.02 K/UL — SIGNIFICANT CHANGE UP (ref 3.8–10.5)
WBC # FLD AUTO: 5.02 K/UL — SIGNIFICANT CHANGE UP (ref 3.8–10.5)

## 2021-12-04 PROCEDURE — 71045 X-RAY EXAM CHEST 1 VIEW: CPT | Mod: 26

## 2021-12-04 PROCEDURE — 99285 EMERGENCY DEPT VISIT HI MDM: CPT

## 2021-12-04 RX ORDER — FAMOTIDINE 10 MG/ML
20 INJECTION INTRAVENOUS ONCE
Refills: 0 | Status: COMPLETED | OUTPATIENT
Start: 2021-12-04 | End: 2021-12-04

## 2021-12-04 RX ORDER — SODIUM CHLORIDE 9 MG/ML
1000 INJECTION, SOLUTION INTRAVENOUS ONCE
Refills: 0 | Status: COMPLETED | OUTPATIENT
Start: 2021-12-04 | End: 2021-12-04

## 2021-12-04 RX ORDER — ONDANSETRON 8 MG/1
4 TABLET, FILM COATED ORAL ONCE
Refills: 0 | Status: COMPLETED | OUTPATIENT
Start: 2021-12-04 | End: 2021-12-04

## 2021-12-04 RX ADMIN — ONDANSETRON 4 MILLIGRAM(S): 8 TABLET, FILM COATED ORAL at 23:45

## 2021-12-04 RX ADMIN — FAMOTIDINE 20 MILLIGRAM(S): 10 INJECTION INTRAVENOUS at 23:44

## 2021-12-04 RX ADMIN — SODIUM CHLORIDE 1000 MILLILITER(S): 9 INJECTION, SOLUTION INTRAVENOUS at 23:45

## 2021-12-04 NOTE — ED ADULT NURSE NOTE - OBJECTIVE STATEMENT
Patient is A&Ox2, in NAD. Patient presents to ED with son c/o vomiting regularly and weakness. Patient has a hx of anemia and is currently being treated for breast CA. Son states the patient has been falling regularly at home as per his sister who lives with the patient. Patient offers no complaints at this time. Skin is warm, dry and color is normal for race. NSR on CM, HR in the 80's. Pending lab results at this time. Will continue to monitor.

## 2021-12-04 NOTE — ED ADULT NURSE NOTE - NSIMPLEMENTINTERV_GEN_ALL_ED
Implemented All Fall with Harm Risk Interventions:  [place] to call system. Call bell, personal items and telephone within reach. Instruct patient to call for assistance. Room bathroom lighting operational. Non-slip footwear when patient is off stretcher. Physically safe environment: no spills, clutter or unnecessary equipment. Stretcher in lowest position, wheels locked, appropriate side rails in place. Provide visual cue, wrist band, yellow gown, etc. Monitor gait and stability. Monitor for mental status changes and reorient to person, place, and time. Review medications for side effects contributing to fall risk. Reinforce activity limits and safety measures with patient and family. Provide visual clues: red socks.

## 2021-12-04 NOTE — ED ADULT TRIAGE NOTE - CHIEF COMPLAINT QUOTE
patient brought in by family states "I think her hemoglobin is low", complaining of weakness SOB vomiting has HX of anemia breast ca (being treated)

## 2021-12-05 PROBLEM — C50.919 MALIGNANT NEOPLASM OF UNSPECIFIED SITE OF UNSPECIFIED FEMALE BREAST: Chronic | Status: ACTIVE | Noted: 2018-03-13

## 2021-12-05 LAB
RAPID RVP RESULT: SIGNIFICANT CHANGE UP
SARS-COV-2 RNA SPEC QL NAA+PROBE: SIGNIFICANT CHANGE UP

## 2021-12-05 PROCEDURE — 86901 BLOOD TYPING SEROLOGIC RH(D): CPT

## 2021-12-05 PROCEDURE — 74177 CT ABD & PELVIS W/CONTRAST: CPT | Mod: MA

## 2021-12-05 PROCEDURE — 86850 RBC ANTIBODY SCREEN: CPT

## 2021-12-05 PROCEDURE — 85025 COMPLETE CBC W/AUTO DIFF WBC: CPT

## 2021-12-05 PROCEDURE — 99284 EMERGENCY DEPT VISIT MOD MDM: CPT | Mod: 25

## 2021-12-05 PROCEDURE — 96374 THER/PROPH/DIAG INJ IV PUSH: CPT | Mod: XU

## 2021-12-05 PROCEDURE — 0225U NFCT DS DNA&RNA 21 SARSCOV2: CPT

## 2021-12-05 PROCEDURE — 80053 COMPREHEN METABOLIC PANEL: CPT

## 2021-12-05 PROCEDURE — 96375 TX/PRO/DX INJ NEW DRUG ADDON: CPT

## 2021-12-05 PROCEDURE — 74177 CT ABD & PELVIS W/CONTRAST: CPT | Mod: 26,MA

## 2021-12-05 PROCEDURE — 36415 COLL VENOUS BLD VENIPUNCTURE: CPT

## 2021-12-05 PROCEDURE — 86900 BLOOD TYPING SEROLOGIC ABO: CPT

## 2021-12-05 PROCEDURE — 71045 X-RAY EXAM CHEST 1 VIEW: CPT

## 2021-12-05 RX ORDER — ONDANSETRON 8 MG/1
1 TABLET, FILM COATED ORAL
Qty: 20 | Refills: 0
Start: 2021-12-05

## 2021-12-05 NOTE — ED PROVIDER NOTE - CROS ED ROS STATEMENT
I called the patient back and informed her that unless she has had an anaphylactic reaction to a vaccine or an IV infusion, then she should proceed with the COVID vaccine. She verbalized an understanding.   all other ROS negative except as per HPI

## 2021-12-05 NOTE — ED PROVIDER NOTE - OBJECTIVE STATEMENT
81yof w/ breast CA complaining of about a week or two of intermittent periumbilical abdominal discomfort and nausea. Gradual onset of symptoms, poor appetite, having difficulty keeping down fluids or food. No fevers, chills or other sick contacts. Occasional cough.

## 2021-12-05 NOTE — ED PROVIDER NOTE - NSFOLLOWUPINSTRUCTIONS_ED_ALL_ED_FT
Follow up with your primary doctor as soon as possible  Return to the ER with any new, worsening or persistent symptoms.

## 2021-12-05 NOTE — ED PROVIDER NOTE - CLINICAL SUMMARY MEDICAL DECISION MAKING FREE TEXT BOX
Mild non-specific abdominal discomfort with nausea and vomiting, treated with fluids and antiemetics with good symptom control now tolerating PO and feels much better. CT abd with findings consistent with known metastatic disease, no actionable findings. DC with ondansetron prn for symptom control.

## 2021-12-05 NOTE — ED PROVIDER NOTE - PATIENT PORTAL LINK FT
You can access the FollowMyHealth Patient Portal offered by [facility] by registering at the following website: [email or web address]. By joining [employer]’s FollowMyHealth portal, you will also be able to view your health information using other applications (apps) compatible with our system.

## 2022-04-26 NOTE — ED PROVIDER NOTE - CONSTITUTIONAL MOOD
Office note dictated    Past Medical History:   Diagnosis Date   • HTN (hypertension), benign    • Parkinson disease (CMS/[facility]) [id number]       ALLERGIES:  No Known Allergies    Current Outpatient Medications   Medication Sig Dispense Refill   • Omega 3 1000 MG capsule Take 2,000 mg by mouth daily.     • baclofen (LIORESAL) 10 MG tablet Take 1 tablet by mouth 3 times daily. 90 tablet 3   • propranolol (INDERAL LA) 80 MG 24 hr capsule TAKE 1 CAPSULE BY MOUTH EVERY DAY 90 capsule 1   • carbidopa-levodopa (PARCOPA)  MG per oral disintegrating tablet Take 1 tablet by mouth 3 times daily. 90 tablet 3   • rOPINIRole (REQUIP) 1 MG tablet Take 1 tablet by mouth 3 times daily. 90 tablet 5   • selegiline (ELDEPRYL) 5 MG tablet TAKE 1 TABLET BY MOUTH IN THE MORNING AND 1 TABLET AT NOON 60 tablet 5   • cholecalciferol (CHOLECALCIFEROL) 25 mcg (1,000 units) tablet Take 2 tablets by mouth daily.     • Vitamin D, Ergocalciferol, 1.25 mg (50,000 units) capsule 1 capsule twice a week for 12 weeks then 2000 over the counter daily 24 capsule 0     No current facility-administered medications for this visit.           Review of Systems:     As mentioned above.    General Exam:    Visit Vitals  /80 (BP Location: LUE - Left upper extremity, Patient Position: Sitting, Cuff Size: Large Adult)   Pulse 68   Ht 5' 8\" (1.727 m)   Wt 77.8 kg (171 lb 8 oz)   BMI 26.08 kg/m²         Neurology Exam:    Attention span and concentration: Normal.  The patient was awake, alert and oriented to person time and place.  Speech and language functions: Normal. Can repeat well. Can understand well.  Cranial nerves: Visual fields are full. Pupils are round equal and reactive to light. Extraocular movements were full. Face was symmetric for muscle and sensation. Tongue in the midline.  Motor examination: Normal tone and bulk. No drift. Strength was 5 out of 5 bilaterally in the upper and lower extremities proximally and distally.  I do not  detect any significant dystonic posturing in the left hand.    Sensory examination: normal to pinprick, vibration, temperature   Deep tendon reflexes in the upper extremities:  Brisk in the biceps and triceps  and brachioradialis bilaterally.  Deep tendon reflexes:  Brisk knee and ankle jerks bilaterally.  Plantar reflexes: Flexor bilaterally  Coordination: Normal finger-nose-finger, heel-to-shin, and rapid alternating movement.  Gait: Normal.  She can stand from a chair without difficulty.  Gait and stance are normal.  She has good arm swing bilaterally.  She does not have any retropulsion of postural instability.      Exam is stable.     appropriate

## 2023-03-15 NOTE — DISCHARGE NOTE ADULT - NS AS DC FU INST LIST INST
Rx request   Requested Prescriptions     Pending Prescriptions Disp Refills    hydrOXYzine pamoate (VISTARIL) 25 MG capsule [Pharmacy Med Name: HYDROXYZINE LASHON 25MG CAP 25 Capsule] 30 capsule 10     Sig: TAKE 1 CAPSULE BY MOUTH EVERY NIGHT AS NEEDED FOR ANXIETY AND TROUBLE SLEEPING    azelastine (ASTELIN) 0.1 % nasal spray [Pharmacy Med Name: AZELASTINE 137MCG NASL 30 0.1 Solution] 30 mL 10     Sig: INSTILL TWO (2) SPRAYS IN EACH NOSTRIL TWICE DAILY     LOV 1/24/2022  Last refill 5/13/22  Next Visit Date:  No future appointments.
no

## 2023-03-20 NOTE — ED CLERICAL - NS ED CLERK UNITS
1N 1N/124.2 Nsaids Counseling: NSAID Counseling: I discussed with the patient that NSAIDs should be taken with food. Prolonged use of NSAIDs can result in the development of stomach ulcers.  Patient advised to stop taking NSAIDs if abdominal pain occurs.  The patient verbalized understanding of the proper use and possible adverse effects of NSAIDs.  All of the patient's questions and concerns were addressed.

## 2024-06-25 NOTE — ED ADULT NURSE NOTE - PAIN: PRESENCE, MLM
From: Franco Tamez  To: Golden Hyde DO  Sent: 6/24/2024 8:03 PM CDT  Subject: Glipizide    Hi Dr. Hyde,     The 2.5 Glipizide tablets are expensive. I’ve been taking the 5mg tabs twice a day & tolerating it. Should we continue with Glipizide 5mg twice a day?     Franco  
complains of pain/discomfort

## 2025-04-17 NOTE — ED PROVIDER NOTE - CONSTITUTIONAL APPEARANCE HYGIENE, MLM
Patient calling requesting to speak with a nurse. Patient stated that she went to the dentist and patient found out she has an infection in her tooth. Patient needs to take antibiotics at this point. Patient is wondering if taking the antibiotics would affect any of the current medications patient is taking currently. Patient would like a call back, please call patient. Thank you!   chronically ill appearing